# Patient Record
Sex: FEMALE | Race: WHITE | Employment: OTHER | URBAN - METROPOLITAN AREA
[De-identification: names, ages, dates, MRNs, and addresses within clinical notes are randomized per-mention and may not be internally consistent; named-entity substitution may affect disease eponyms.]

---

## 2021-01-20 NOTE — ED INITIAL ASSESSMENT (HPI)
Patient here with report of laceration to right wrist from a kitchen aid mixer bowl. Bleeding stopped.

## 2021-01-21 NOTE — ED PROVIDER NOTES
Patient Seen in: [facility] Emergency Department      History   Patient presents with:  Laceration/Abrasion    Stated Complaint: laceration to wrist, not intentional    HPI/Subjective:   HPI    29-year-old female here with laceration to right wrist. Pulmonary:      Effort: Pulmonary effort is normal.   Musculoskeletal:         General: Signs of injury present. No tenderness. Comments: Laceration to right ventral wrist lateral aspect, 3 cm in length, neurovascular intact.    Skin:     General: Sk wound edges. No sign of vascular, tendon/nerve injury. The wound was approximated with 5 interrupted sutures of 4-0 ethilon, simple closure. Good approximation. The patient tolerated procedure well without complication.            MDM      ASSESSMENT & P

## 2022-11-28 ENCOUNTER — HOSPITAL ENCOUNTER (OUTPATIENT)
Age: 32
Discharge: HOME | End: 2022-11-28
Payer: COMMERCIAL

## 2022-11-28 ENCOUNTER — LAB ENCOUNTER (OUTPATIENT)
Dept: LAB | Age: 32
End: 2022-11-28
Payer: COMMERCIAL

## 2022-11-28 VITALS
HEART RATE: 94 BPM | SYSTOLIC BLOOD PRESSURE: 123 MMHG | OXYGEN SATURATION: 98 % | BODY MASS INDEX: 31.89 KG/M2 | TEMPERATURE: 98 F | HEIGHT: 63 IN | RESPIRATION RATE: 18 BRPM | DIASTOLIC BLOOD PRESSURE: 78 MMHG | WEIGHT: 180 LBS

## 2022-11-28 DIAGNOSIS — Z13.29 THYROID DISORDER SCREENING: ICD-10-CM

## 2022-11-28 DIAGNOSIS — Z13.1 ENCOUNTER FOR SCREENING EXAMINATION FOR IMPAIRED GLUCOSE REGULATION AND DIABETES MELLITUS: ICD-10-CM

## 2022-11-28 DIAGNOSIS — Z13.220 LIPID SCREENING: ICD-10-CM

## 2022-11-28 DIAGNOSIS — J06.9 VIRAL URI: ICD-10-CM

## 2022-11-28 DIAGNOSIS — R68.89 FLU-LIKE SYMPTOMS: Primary | ICD-10-CM

## 2022-11-28 DIAGNOSIS — Z13.0 SCREENING FOR DEFICIENCY ANEMIA: ICD-10-CM

## 2022-11-28 LAB
ALBUMIN SERPL-MCNC: 3.6 G/DL (ref 3.4–5)
ALBUMIN/GLOB SERPL: 1 {RATIO} (ref 1–2)
ALP LIVER SERPL-CCNC: 75 U/L
ALT SERPL-CCNC: 34 U/L
ANION GAP SERPL CALC-SCNC: 5 MMOL/L (ref 0–18)
AST SERPL-CCNC: 15 U/L (ref 15–37)
BASOPHILS # BLD AUTO: 0.04 X10(3) UL (ref 0–0.2)
BASOPHILS NFR BLD AUTO: 0.5 %
BILIRUB SERPL-MCNC: 0.5 MG/DL (ref 0.1–2)
BUN BLD-MCNC: 8 MG/DL (ref 7–18)
CALCIUM BLD-MCNC: 9.1 MG/DL (ref 8.5–10.1)
CHLORIDE SERPL-SCNC: 108 MMOL/L (ref 98–112)
CHOLEST SERPL-MCNC: 188 MG/DL (ref ?–200)
CO2 SERPL-SCNC: 26 MMOL/L (ref 21–32)
CREAT BLD-MCNC: 0.69 MG/DL
EOSINOPHIL # BLD AUTO: 0.07 X10(3) UL (ref 0–0.7)
EOSINOPHIL NFR BLD AUTO: 0.8 %
ERYTHROCYTE [DISTWIDTH] IN BLOOD BY AUTOMATED COUNT: 12.6 %
FASTING PATIENT LIPID ANSWER: YES
FASTING STATUS PATIENT QL REPORTED: YES
GFR SERPLBLD BASED ON 1.73 SQ M-ARVRAT: 120 ML/MIN/1.73M2 (ref 60–?)
GLOBULIN PLAS-MCNC: 3.7 G/DL (ref 2.8–4.4)
GLUCOSE BLD-MCNC: 95 MG/DL (ref 70–99)
HCT VFR BLD AUTO: 39.3 %
HDLC SERPL-MCNC: 57 MG/DL (ref 40–59)
HGB BLD-MCNC: 12.9 G/DL
IMM GRANULOCYTES # BLD AUTO: 0.02 X10(3) UL (ref 0–1)
IMM GRANULOCYTES NFR BLD: 0.2 %
LDLC SERPL CALC-MCNC: 115 MG/DL (ref ?–100)
LYMPHOCYTES # BLD AUTO: 1.15 X10(3) UL (ref 1–4)
LYMPHOCYTES NFR BLD AUTO: 13.5 %
MCH RBC QN AUTO: 30.5 PG (ref 26–34)
MCHC RBC AUTO-ENTMCNC: 32.8 G/DL (ref 31–37)
MCV RBC AUTO: 92.9 FL
MONOCYTES # BLD AUTO: 0.58 X10(3) UL (ref 0.1–1)
MONOCYTES NFR BLD AUTO: 6.8 %
NEUTROPHILS # BLD AUTO: 6.65 X10 (3) UL (ref 1.5–7.7)
NEUTROPHILS # BLD AUTO: 6.65 X10(3) UL (ref 1.5–7.7)
NEUTROPHILS NFR BLD AUTO: 78.2 %
NONHDLC SERPL-MCNC: 131 MG/DL (ref ?–130)
OSMOLALITY SERPL CALC.SUM OF ELEC: 286 MOSM/KG (ref 275–295)
PLATELET # BLD AUTO: 186 10(3)UL (ref 150–450)
POCT INFLUENZA A: NEGATIVE
POCT INFLUENZA B: NEGATIVE
POTASSIUM SERPL-SCNC: 4.2 MMOL/L (ref 3.5–5.1)
PROT SERPL-MCNC: 7.3 G/DL (ref 6.4–8.2)
RBC # BLD AUTO: 4.23 X10(6)UL
S PYO AG THROAT QL: NEGATIVE
SARS-COV-2 RNA RESP QL NAA+PROBE: NOT DETECTED
SODIUM SERPL-SCNC: 139 MMOL/L (ref 136–145)
TRIGL SERPL-MCNC: 88 MG/DL (ref 30–149)
TSI SER-ACNC: 0.66 MIU/ML (ref 0.36–3.74)
VLDLC SERPL CALC-MCNC: 15 MG/DL (ref 0–30)
WBC # BLD AUTO: 8.5 X10(3) UL (ref 4–11)

## 2022-11-28 PROCEDURE — 85025 COMPLETE CBC W/AUTO DIFF WBC: CPT

## 2022-11-28 PROCEDURE — 99203 OFFICE O/P NEW LOW 30 MIN: CPT | Performed by: NURSE PRACTITIONER

## 2022-11-28 PROCEDURE — 36415 COLL VENOUS BLD VENIPUNCTURE: CPT

## 2022-11-28 PROCEDURE — U0002 COVID-19 LAB TEST NON-CDC: HCPCS | Performed by: NURSE PRACTITIONER

## 2022-11-28 PROCEDURE — 80061 LIPID PANEL: CPT

## 2022-11-28 PROCEDURE — 80053 COMPREHEN METABOLIC PANEL: CPT

## 2022-11-28 PROCEDURE — 82306 VITAMIN D 25 HYDROXY: CPT

## 2022-11-28 PROCEDURE — 84443 ASSAY THYROID STIM HORMONE: CPT

## 2022-11-28 PROCEDURE — 87880 STREP A ASSAY W/OPTIC: CPT | Performed by: NURSE PRACTITIONER

## 2022-11-28 PROCEDURE — 87502 INFLUENZA DNA AMP PROBE: CPT | Performed by: NURSE PRACTITIONER

## 2022-12-14 ENCOUNTER — HOSPITAL ENCOUNTER (OUTPATIENT)
Age: 32
Discharge: HOME | End: 2022-12-14
Payer: COMMERCIAL

## 2022-12-14 VITALS
DIASTOLIC BLOOD PRESSURE: 69 MMHG | HEART RATE: 70 BPM | SYSTOLIC BLOOD PRESSURE: 110 MMHG | TEMPERATURE: 98 F | RESPIRATION RATE: 16 BRPM

## 2022-12-14 DIAGNOSIS — B96.89 ACUTE BACTERIAL PHARYNGITIS: Primary | ICD-10-CM

## 2022-12-14 DIAGNOSIS — J02.8 ACUTE BACTERIAL PHARYNGITIS: Primary | ICD-10-CM

## 2022-12-14 LAB — S PYO AG THROAT QL: NEGATIVE

## 2022-12-14 PROCEDURE — 99213 OFFICE O/P EST LOW 20 MIN: CPT | Performed by: NURSE PRACTITIONER

## 2022-12-14 PROCEDURE — 87880 STREP A ASSAY W/OPTIC: CPT | Performed by: NURSE PRACTITIONER

## 2022-12-14 RX ORDER — PREDNISONE 20 MG/1
40 TABLET ORAL DAILY
Qty: 10 TABLET | Refills: 0 | Status: SHIPPED | OUTPATIENT
Start: 2022-12-14 | End: 2022-12-19

## 2022-12-14 RX ORDER — AMOXICILLIN AND CLAVULANATE POTASSIUM 875; 125 MG/1; MG/1
1 TABLET, FILM COATED ORAL 2 TIMES DAILY
Qty: 14 TABLET | Refills: 0 | Status: SHIPPED | OUTPATIENT
Start: 2022-12-14 | End: 2022-12-21

## 2022-12-14 NOTE — DISCHARGE INSTRUCTIONS
Rest and drink plenty of fluids. Take Tylenol and/or ibuprofen as needed for pain or fever. Start the antibiotics and make sure to finish the entire prescription. Start the prednisone as prescribed, and make sure to take this with food. Do not share glasses, cups, or utensils. Make sure to change your toothbrush after 48 hours of antibiotic use. Follow up with your PCP in 1 week. Thank you for choosing [facility] for your care.

## 2022-12-14 NOTE — ED INITIAL ASSESSMENT (HPI)
Pt was seen here on 11/28 for cough and sore throat. Strep, covid, and flu were all negative. Pt returns today with laryngitis x 10 days. Intermittent sore throat continues.  Denies fever, aches/chills

## 2023-04-12 PROCEDURE — 87591 N.GONORRHOEAE DNA AMP PROB: CPT | Performed by: OBSTETRICS & GYNECOLOGY

## 2023-04-12 PROCEDURE — 87491 CHLMYD TRACH DNA AMP PROBE: CPT | Performed by: OBSTETRICS & GYNECOLOGY

## 2023-04-12 PROCEDURE — 87086 URINE CULTURE/COLONY COUNT: CPT | Performed by: OBSTETRICS & GYNECOLOGY

## 2023-04-25 ENCOUNTER — LAB ENCOUNTER (OUTPATIENT)
Dept: LAB | Age: 33
End: 2023-04-25
Attending: OBSTETRICS & GYNECOLOGY
Payer: COMMERCIAL

## 2023-04-25 DIAGNOSIS — Z34.81 SUPERVISION OF NORMAL INTRAUTERINE PREGNANCY IN MULTIGRAVIDA, FIRST TRIMESTER: ICD-10-CM

## 2023-04-25 LAB
ANTIBODY SCREEN: NEGATIVE
BASOPHILS # BLD AUTO: 0.04 X10(3) UL (ref 0–0.2)
BASOPHILS NFR BLD AUTO: 0.6 %
EOSINOPHIL # BLD AUTO: 0.12 X10(3) UL (ref 0–0.7)
EOSINOPHIL NFR BLD AUTO: 1.7 %
ERYTHROCYTE [DISTWIDTH] IN BLOOD BY AUTOMATED COUNT: 12.2 %
HBV SURFACE AG SER-ACNC: <0.1 [IU]/L
HBV SURFACE AG SERPL QL IA: NONREACTIVE
HCT VFR BLD AUTO: 37.9 %
HCV AB SERPL QL IA: NONREACTIVE
HGB BLD-MCNC: 12.9 G/DL
IMM GRANULOCYTES # BLD AUTO: 0.02 X10(3) UL (ref 0–1)
IMM GRANULOCYTES NFR BLD: 0.3 %
LYMPHOCYTES # BLD AUTO: 1.87 X10(3) UL (ref 1–4)
LYMPHOCYTES NFR BLD AUTO: 27 %
MCH RBC QN AUTO: 30.1 PG (ref 26–34)
MCHC RBC AUTO-ENTMCNC: 34 G/DL (ref 31–37)
MCV RBC AUTO: 88.6 FL
MONOCYTES # BLD AUTO: 0.35 X10(3) UL (ref 0.1–1)
MONOCYTES NFR BLD AUTO: 5.1 %
NEUTROPHILS # BLD AUTO: 4.53 X10 (3) UL (ref 1.5–7.7)
NEUTROPHILS # BLD AUTO: 4.53 X10(3) UL (ref 1.5–7.7)
NEUTROPHILS NFR BLD AUTO: 65.3 %
PLATELET # BLD AUTO: 213 10(3)UL (ref 150–450)
RBC # BLD AUTO: 4.28 X10(6)UL
RH BLOOD TYPE: POSITIVE
RUBV IGG SER QL: POSITIVE
RUBV IGG SER-ACNC: 94.3 IU/ML (ref 10–?)
T PALLIDUM AB SER QL IA: NONREACTIVE
WBC # BLD AUTO: 6.9 X10(3) UL (ref 4–11)

## 2023-04-25 PROCEDURE — 86762 RUBELLA ANTIBODY: CPT

## 2023-04-25 PROCEDURE — 86850 RBC ANTIBODY SCREEN: CPT

## 2023-04-25 PROCEDURE — 86900 BLOOD TYPING SEROLOGIC ABO: CPT

## 2023-04-25 PROCEDURE — 85025 COMPLETE CBC W/AUTO DIFF WBC: CPT

## 2023-04-25 PROCEDURE — 36415 COLL VENOUS BLD VENIPUNCTURE: CPT

## 2023-04-25 PROCEDURE — 87340 HEPATITIS B SURFACE AG IA: CPT

## 2023-04-25 PROCEDURE — 87389 HIV-1 AG W/HIV-1&-2 AB AG IA: CPT

## 2023-04-25 PROCEDURE — 86901 BLOOD TYPING SEROLOGIC RH(D): CPT

## 2023-04-25 PROCEDURE — 86803 HEPATITIS C AB TEST: CPT

## 2023-04-25 PROCEDURE — 86780 TREPONEMA PALLIDUM: CPT

## 2023-07-27 ENCOUNTER — LAB ENCOUNTER (OUTPATIENT)
Dept: LAB | Age: 33
End: 2023-07-27
Attending: OBSTETRICS & GYNECOLOGY
Payer: COMMERCIAL

## 2023-07-27 DIAGNOSIS — Z3A.18 18 WEEKS GESTATION OF PREGNANCY: ICD-10-CM

## 2023-07-27 PROCEDURE — 36415 COLL VENOUS BLD VENIPUNCTURE: CPT

## 2023-07-27 PROCEDURE — 82105 ALPHA-FETOPROTEIN SERUM: CPT

## 2023-08-01 LAB
AFP MOM: 1.72
AFP VALUE: 146.7 NG/ML
GA ON COLL DATE: 23.9 WEEKS
INSULIN DEP AFP: NO
MAT AGE AT EDD: 31.6 YR
MULTIPLE GEST AFP: NO
OSBR RISK 1 IN AFP: 1551
WEIGHT AFP: 183 LBS

## 2023-08-02 ENCOUNTER — LAB ENCOUNTER (OUTPATIENT)
Dept: LAB | Age: 33
End: 2023-08-02
Attending: OBSTETRICS & GYNECOLOGY
Payer: COMMERCIAL

## 2023-08-02 DIAGNOSIS — Z34.82 ENCOUNTER FOR SUPERVISION OF OTHER NORMAL PREGNANCY IN SECOND TRIMESTER: ICD-10-CM

## 2023-08-02 LAB
BASOPHILS # BLD AUTO: 0.04 X10(3) UL (ref 0–0.2)
BASOPHILS NFR BLD AUTO: 0.4 %
EOSINOPHIL # BLD AUTO: 0.12 X10(3) UL (ref 0–0.7)
EOSINOPHIL NFR BLD AUTO: 1.2 %
ERYTHROCYTE [DISTWIDTH] IN BLOOD BY AUTOMATED COUNT: 13.2 %
GLUCOSE 1H P GLC SERPL-MCNC: 139 MG/DL
HCT VFR BLD AUTO: 36.4 %
HGB BLD-MCNC: 11.7 G/DL
IMM GRANULOCYTES # BLD AUTO: 0.06 X10(3) UL (ref 0–1)
IMM GRANULOCYTES NFR BLD: 0.6 %
LYMPHOCYTES # BLD AUTO: 1.47 X10(3) UL (ref 1–4)
LYMPHOCYTES NFR BLD AUTO: 15.1 %
MCH RBC QN AUTO: 31.3 PG (ref 26–34)
MCHC RBC AUTO-ENTMCNC: 32.1 G/DL (ref 31–37)
MCV RBC AUTO: 97.3 FL
MONOCYTES # BLD AUTO: 0.53 X10(3) UL (ref 0.1–1)
MONOCYTES NFR BLD AUTO: 5.4 %
NEUTROPHILS # BLD AUTO: 7.54 X10 (3) UL (ref 1.5–7.7)
NEUTROPHILS # BLD AUTO: 7.54 X10(3) UL (ref 1.5–7.7)
NEUTROPHILS NFR BLD AUTO: 77.3 %
PLATELET # BLD AUTO: 194 10(3)UL (ref 150–450)
RBC # BLD AUTO: 3.74 X10(6)UL
WBC # BLD AUTO: 9.8 X10(3) UL (ref 4–11)

## 2023-08-02 PROCEDURE — 36415 COLL VENOUS BLD VENIPUNCTURE: CPT

## 2023-08-02 PROCEDURE — 82950 GLUCOSE TEST: CPT

## 2023-08-02 PROCEDURE — 85025 COMPLETE CBC W/AUTO DIFF WBC: CPT

## 2023-08-10 ENCOUNTER — LAB ENCOUNTER (OUTPATIENT)
Dept: LAB | Age: 33
End: 2023-08-10
Attending: OBSTETRICS & GYNECOLOGY
Payer: COMMERCIAL

## 2023-08-10 DIAGNOSIS — O99.810 ABNORMAL MATERNAL GLUCOSE TOLERANCE, ANTEPARTUM: ICD-10-CM

## 2023-08-10 LAB
GLUCOSE 1H P GLC SERPL-MCNC: 173 MG/DL
GLUCOSE 2H P GLC SERPL-MCNC: 158 MG/DL
GLUCOSE 3H P GLC SERPL-MCNC: 132 MG/DL (ref 70–140)
GLUCOSE P FAST SERPL-MCNC: 88 MG/DL

## 2023-08-10 PROCEDURE — 36415 COLL VENOUS BLD VENIPUNCTURE: CPT

## 2023-08-10 PROCEDURE — 82952 GTT-ADDED SAMPLES: CPT

## 2023-08-10 PROCEDURE — 82951 GLUCOSE TOLERANCE TEST (GTT): CPT

## 2023-09-21 ENCOUNTER — LAB ENCOUNTER (OUTPATIENT)
Dept: LAB | Age: 33
End: 2023-09-21
Attending: OBSTETRICS & GYNECOLOGY
Payer: COMMERCIAL

## 2023-09-21 DIAGNOSIS — Z34.83 SUPERVISION OF NORMAL INTRAUTERINE PREGNANCY IN MULTIGRAVIDA, THIRD TRIMESTER: ICD-10-CM

## 2023-09-21 PROCEDURE — 36415 COLL VENOUS BLD VENIPUNCTURE: CPT

## 2023-09-21 PROCEDURE — 87389 HIV-1 AG W/HIV-1&-2 AB AG IA: CPT

## 2023-10-11 PROCEDURE — 87653 STREP B DNA AMP PROBE: CPT | Performed by: OBSTETRICS & GYNECOLOGY

## 2023-10-11 PROCEDURE — 87081 CULTURE SCREEN ONLY: CPT | Performed by: OBSTETRICS & GYNECOLOGY

## 2023-11-05 ENCOUNTER — HOSPITAL ENCOUNTER (OUTPATIENT)
Facility: HOSPITAL | Age: 33
Discharge: HOME | End: 2023-11-05
Attending: OBSTETRICS & GYNECOLOGY | Admitting: OBSTETRICS & GYNECOLOGY
Payer: COMMERCIAL

## 2023-11-05 VITALS
WEIGHT: 211 LBS | DIASTOLIC BLOOD PRESSURE: 83 MMHG | BODY MASS INDEX: 37 KG/M2 | SYSTOLIC BLOOD PRESSURE: 133 MMHG | HEART RATE: 93 BPM

## 2023-11-05 PROCEDURE — 99214 OFFICE O/P EST MOD 30 MIN: CPT

## 2023-11-05 PROCEDURE — 59025 FETAL NON-STRESS TEST: CPT

## 2023-11-05 NOTE — DISCHARGE INSTRUCTIONS
Discharge Instructions    Diet: Regular  Activity: Normal activity         General Instructions    Call your [facility] doctor if: Fluid leaking from your vagina; Decrease in fetal movement;Vaginal or rectal pressure;Uterine contractions 10 minutes or closer for 1 to 2 hours;Vaginal bleeding;Uterine contractions increasing in intensity and frequency; Temperature greater than 100F  Early labor comfort measures: Relax, sleep, take a warm bath or shower for 30 minutes or less; Take a walk;Drink fluids and eat small light meals

## 2023-11-05 NOTE — NST
Nonstress Test   Patient: Daysi Boykin    Gestation: 39w5d    NST:       Variability: Moderate           Accelerations: Yes           Decelerations: None                        Uterine Irritability: Yes           Contractions: Irregular                      Baseline = 130                  Acoustic Stimulator: No           Nonstress Test Interpretation: Reactive           Nonstress Test Second Interpretation: Reactive                     Additional Comments  Agree with above    Hair Recinos  11/6/23

## 2023-11-05 NOTE — PROGRESS NOTES
Pt is a 34year old female admitted to [facility]. Patient presents with:  R/o Rom     Pt is  39w5d intra-uterine pregnancy. History obtained. Oriented to room, staff, and plan of care.

## 2023-11-11 ENCOUNTER — HOSPITAL ENCOUNTER (INPATIENT)
Facility: HOSPITAL | Age: 33
LOS: 2 days | Discharge: HOME | End: 2023-11-13
Attending: OBSTETRICS & GYNECOLOGY | Admitting: OBSTETRICS & GYNECOLOGY
Payer: COMMERCIAL

## 2023-11-11 PROCEDURE — 99214 OFFICE O/P EST MOD 30 MIN: CPT

## 2023-11-11 RX ORDER — SODIUM CHLORIDE, SODIUM LACTATE, POTASSIUM CHLORIDE, CALCIUM CHLORIDE 600; 310; 30; 20 MG/100ML; MG/100ML; MG/100ML; MG/100ML
INJECTION, SOLUTION INTRAVENOUS CONTINUOUS
Status: DISCONTINUED | OUTPATIENT
Start: 2023-11-11 | End: 2023-11-12

## 2023-11-11 RX ORDER — HYDROMORPHONE HYDROCHLORIDE 1 MG/ML
1 INJECTION, SOLUTION INTRAMUSCULAR; INTRAVENOUS; SUBCUTANEOUS EVERY 2 HOUR PRN
Status: DISCONTINUED | OUTPATIENT
Start: 2023-11-11 | End: 2023-11-12

## 2023-11-12 ENCOUNTER — ANESTHESIA EVENT (OUTPATIENT)
Dept: OBGYN UNIT | Facility: HOSPITAL | Age: 33
End: 2023-11-12
Payer: COMMERCIAL

## 2023-11-12 ENCOUNTER — ANESTHESIA (OUTPATIENT)
Dept: OBGYN UNIT | Facility: HOSPITAL | Age: 33
End: 2023-11-12
Payer: COMMERCIAL

## 2023-11-12 PROBLEM — Z34.90 PREGNANCY (HCC): Status: ACTIVE | Noted: 2023-11-12

## 2023-11-12 PROBLEM — Z34.90 PREGNANCY: Status: ACTIVE | Noted: 2023-11-12

## 2023-11-12 LAB
ANTIBODY SCREEN: NEGATIVE
BASOPHILS # BLD AUTO: 0.05 X10(3) UL (ref 0–0.2)
BASOPHILS NFR BLD AUTO: 0.4 %
EOSINOPHIL # BLD AUTO: 0.08 X10(3) UL (ref 0–0.7)
EOSINOPHIL NFR BLD AUTO: 0.6 %
ERYTHROCYTE [DISTWIDTH] IN BLOOD BY AUTOMATED COUNT: 13.2 %
HCT VFR BLD AUTO: 36.3 %
HGB BLD-MCNC: 12.2 G/DL
IMM GRANULOCYTES # BLD AUTO: 0.11 X10(3) UL (ref 0–1)
IMM GRANULOCYTES NFR BLD: 0.9 %
LYMPHOCYTES # BLD AUTO: 1.93 X10(3) UL (ref 1–4)
LYMPHOCYTES NFR BLD AUTO: 15.4 %
MCH RBC QN AUTO: 31 PG (ref 26–34)
MCHC RBC AUTO-ENTMCNC: 33.6 G/DL (ref 31–37)
MCV RBC AUTO: 92.4 FL
MONOCYTES # BLD AUTO: 0.75 X10(3) UL (ref 0.1–1)
MONOCYTES NFR BLD AUTO: 6 %
NEUTROPHILS # BLD AUTO: 9.62 X10 (3) UL (ref 1.5–7.7)
NEUTROPHILS # BLD AUTO: 9.62 X10(3) UL (ref 1.5–7.7)
NEUTROPHILS NFR BLD AUTO: 76.7 %
PLATELET # BLD AUTO: 174 10(3)UL (ref 150–450)
RBC # BLD AUTO: 3.93 X10(6)UL
RH BLOOD TYPE: POSITIVE
T PALLIDUM AB SER QL IA: NONREACTIVE
WBC # BLD AUTO: 12.5 X10(3) UL (ref 4–11)

## 2023-11-12 PROCEDURE — 85025 COMPLETE CBC W/AUTO DIFF WBC: CPT | Performed by: OBSTETRICS & GYNECOLOGY

## 2023-11-12 PROCEDURE — 10H073Z INSERTION OF MONITORING ELECTRODE INTO PRODUCTS OF CONCEPTION, VIA NATURAL OR ARTIFICIAL OPENING: ICD-10-PCS | Performed by: OBSTETRICS & GYNECOLOGY

## 2023-11-12 PROCEDURE — 10H07YZ INSERTION OF OTHER DEVICE INTO PRODUCTS OF CONCEPTION, VIA NATURAL OR ARTIFICIAL OPENING: ICD-10-PCS | Performed by: OBSTETRICS & GYNECOLOGY

## 2023-11-12 PROCEDURE — 4A1H74Z MONITORING OF PRODUCTS OF CONCEPTION, CARDIAC ELECTRICAL ACTIVITY, VIA NATURAL OR ARTIFICIAL OPENING: ICD-10-PCS | Performed by: OBSTETRICS & GYNECOLOGY

## 2023-11-12 PROCEDURE — 86900 BLOOD TYPING SEROLOGIC ABO: CPT | Performed by: OBSTETRICS & GYNECOLOGY

## 2023-11-12 PROCEDURE — 86901 BLOOD TYPING SEROLOGIC RH(D): CPT | Performed by: OBSTETRICS & GYNECOLOGY

## 2023-11-12 PROCEDURE — 0KQM0ZZ REPAIR PERINEUM MUSCLE, OPEN APPROACH: ICD-10-PCS | Performed by: OBSTETRICS & GYNECOLOGY

## 2023-11-12 PROCEDURE — 86780 TREPONEMA PALLIDUM: CPT | Performed by: OBSTETRICS & GYNECOLOGY

## 2023-11-12 PROCEDURE — 86850 RBC ANTIBODY SCREEN: CPT | Performed by: OBSTETRICS & GYNECOLOGY

## 2023-11-12 RX ORDER — IBUPROFEN 600 MG/1
600 TABLET ORAL EVERY 6 HOURS
Status: DISCONTINUED | OUTPATIENT
Start: 2023-11-12 | End: 2023-11-14

## 2023-11-12 RX ORDER — NALBUPHINE HYDROCHLORIDE 10 MG/ML
2.5 INJECTION, SOLUTION INTRAMUSCULAR; INTRAVENOUS; SUBCUTANEOUS
Status: DISCONTINUED | OUTPATIENT
Start: 2023-11-12 | End: 2023-11-12

## 2023-11-12 RX ORDER — DOCUSATE SODIUM 100 MG/1
100 CAPSULE, LIQUID FILLED ORAL 2 TIMES DAILY
Status: DISCONTINUED | OUTPATIENT
Start: 2023-11-12 | End: 2023-11-14

## 2023-11-12 RX ORDER — LIDOCAINE HYDROCHLORIDE 10 MG/ML
INJECTION, SOLUTION EPIDURAL; INFILTRATION; INTRACAUDAL; PERINEURAL AS NEEDED
Status: DISCONTINUED | OUTPATIENT
Start: 2023-11-12 | End: 2023-11-12 | Stop reason: SURG

## 2023-11-12 RX ORDER — BUPIVACAINE HCL/0.9 % NACL/PF 0.25 %
5 PLASTIC BAG, INJECTION (ML) EPIDURAL AS NEEDED
Status: DISCONTINUED | OUTPATIENT
Start: 2023-11-12 | End: 2023-11-12

## 2023-11-12 RX ORDER — BISACODYL 10 MG
10 SUPPOSITORY, RECTAL RECTAL ONCE AS NEEDED
Status: DISCONTINUED | OUTPATIENT
Start: 2023-11-12 | End: 2023-11-14

## 2023-11-12 RX ORDER — ONDANSETRON 2 MG/ML
4 INJECTION INTRAMUSCULAR; INTRAVENOUS EVERY 6 HOURS PRN
Status: DISCONTINUED | OUTPATIENT
Start: 2023-11-12 | End: 2023-11-12

## 2023-11-12 RX ORDER — ACETAMINOPHEN 500 MG
1000 TABLET ORAL EVERY 6 HOURS PRN
Status: DISCONTINUED | OUTPATIENT
Start: 2023-11-12 | End: 2023-11-14

## 2023-11-12 RX ORDER — IBUPROFEN 600 MG/1
600 TABLET ORAL ONCE AS NEEDED
Status: DISCONTINUED | OUTPATIENT
Start: 2023-11-12 | End: 2023-11-12

## 2023-11-12 RX ORDER — MISOPROSTOL 200 UG/1
1000 TABLET ORAL ONCE AS NEEDED
Status: ACTIVE | OUTPATIENT
Start: 2023-11-12 | End: 2023-11-12

## 2023-11-12 RX ORDER — ACETAMINOPHEN 500 MG
500 TABLET ORAL EVERY 6 HOURS PRN
Status: DISCONTINUED | OUTPATIENT
Start: 2023-11-12 | End: 2023-11-14

## 2023-11-12 RX ORDER — SIMETHICONE 80 MG
80 TABLET,CHEWABLE ORAL 3 TIMES DAILY PRN
Status: DISCONTINUED | OUTPATIENT
Start: 2023-11-12 | End: 2023-11-14

## 2023-11-12 RX ORDER — ACETAMINOPHEN 500 MG
500 TABLET ORAL EVERY 6 HOURS PRN
Status: DISCONTINUED | OUTPATIENT
Start: 2023-11-12 | End: 2023-11-12

## 2023-11-12 RX ORDER — LIDOCAINE HYDROCHLORIDE AND EPINEPHRINE 15; 5 MG/ML; UG/ML
INJECTION, SOLUTION EPIDURAL AS NEEDED
Status: DISCONTINUED | OUTPATIENT
Start: 2023-11-12 | End: 2023-11-12 | Stop reason: SURG

## 2023-11-12 RX ORDER — ACETAMINOPHEN 500 MG
1000 TABLET ORAL EVERY 6 HOURS PRN
Status: DISCONTINUED | OUTPATIENT
Start: 2023-11-12 | End: 2023-11-12

## 2023-11-12 RX ORDER — SODIUM CHLORIDE, SODIUM LACTATE, POTASSIUM CHLORIDE, CALCIUM CHLORIDE 600; 310; 30; 20 MG/100ML; MG/100ML; MG/100ML; MG/100ML
INJECTION, SOLUTION INTRAVENOUS CONTINUOUS
Status: DISCONTINUED | OUTPATIENT
Start: 2023-11-12 | End: 2023-11-12

## 2023-11-12 RX ORDER — TERBUTALINE SULFATE 1 MG/ML
0.25 INJECTION, SOLUTION SUBCUTANEOUS AS NEEDED
Status: DISCONTINUED | OUTPATIENT
Start: 2023-11-12 | End: 2023-11-12

## 2023-11-12 RX ORDER — DEXTROSE, SODIUM CHLORIDE, SODIUM LACTATE, POTASSIUM CHLORIDE, AND CALCIUM CHLORIDE 5; .6; .31; .03; .02 G/100ML; G/100ML; G/100ML; G/100ML; G/100ML
INJECTION, SOLUTION INTRAVENOUS AS NEEDED
Status: DISCONTINUED | OUTPATIENT
Start: 2023-11-12 | End: 2023-11-12

## 2023-11-12 RX ORDER — CITRIC ACID/SODIUM CITRATE 334-500MG
30 SOLUTION, ORAL ORAL AS NEEDED
Status: DISCONTINUED | OUTPATIENT
Start: 2023-11-12 | End: 2023-11-12

## 2023-11-12 RX ADMIN — LIDOCAINE HYDROCHLORIDE AND EPINEPHRINE 2 ML: 15; 5 INJECTION, SOLUTION EPIDURAL at 10:02:00

## 2023-11-12 RX ADMIN — LIDOCAINE HYDROCHLORIDE AND EPINEPHRINE 3 ML: 15; 5 INJECTION, SOLUTION EPIDURAL at 10:01:00

## 2023-11-12 RX ADMIN — LIDOCAINE HYDROCHLORIDE 3 ML: 10 INJECTION, SOLUTION EPIDURAL; INFILTRATION; INTRACAUDAL; PERINEURAL at 09:59:00

## 2023-11-12 NOTE — PROGRESS NOTES
Tracing still excellent with moderate variability.   Good acceleratons and ocassional early and variable decelerations  Cervix now 9 cm' per RN check    Continue to allow a PADMINI Rutledge  11/12/23  15:00

## 2023-11-12 NOTE — PROGRESS NOTES
Admitted with regular contractions late last night for a Trial of Labor in latent phase    Cx - unchanged from office exam: 2+/90/-1    Over the night contractions spaced out with IV analgesics - reactive tracing. Good accelerations no decelerations (negative spontaneous CST)    No cervical change this morning.      Options discussed with RN - she will review with patient  Trying to avoid an IOL in a scared uterus     Saad Shay    11/12/23  07:15

## 2023-11-12 NOTE — PROGRESS NOTES
Epidural placed and is working well  Low dose pitocin started to facilitate the frequency of the contractions    VE: 6/100/0 no caput or molding - ISE and IUPC placed per protocol. Tracing ressuring with rare variables and frequent acceleration. Moderate variability persists.   Contractions now every 3 - 4 minutes lasting 60-70 seconds    Continue to allow a Trial of Labor    Ayala Tatum  11/12/23  12:30

## 2023-11-12 NOTE — ANESTHESIA PROCEDURE NOTES
80 Labor Analgesia    Date/Time: 11/12/2023 9:52 AM    Performed by: Stevenson Syed MD  Authorized by: Stevenson Syed MD      General Information and Staff    Start Time:  11/12/2023 9:52 AM  End Time:  11/12/2023 10:01 AM  Anesthesiologist:  Stevenson Syed MD  Performed by:   Anesthesiologist  Patient Location:  OB  Site Identification: surface landmarks    Reason for Block: labor epidural    Preanesthetic Checklist: patient identified, IV checked, risks and benefits discussed, monitors and equipment checked, pre-op evaluation, timeout performed, IV bolus, anesthesia consent and sterile technique used      Procedure Details    Patient Position:  Sitting  Prep: ChloraPrep    Monitoring:  Heart rate and continuous pulse ox  Approach:  Midline    Epidural Needle    Injection Technique:  ABBI air  Needle Type:  Tuohy  Needle Gauge:  17 G  Needle Length:  3.375 in  Needle Insertion Depth:  6  Location:  L4-5    Spinal Needle      Catheter    Catheter Type:  End hole  Catheter Size:  19 G  Catheter at Skin Depth:  12  Test Dose:  Negative    Assessment      Additional Comments     Test Dose Given at 0952 am  Fentanyl 2 mc/ml + Ropivicaine 0.15% epidural infusion 12cc/hr  Fentanyl 50 mcg/mL 100 mcg

## 2023-11-12 NOTE — PROGRESS NOTES
admitted to triage 3. Ambulated to room with  present, pt to bathroom, gown on, pt to bed. Pt states ctxs have been irregular all day and became more intense and regular around . Pt denies lof or bleeding. Efm and toco applied. Initial assessment done.

## 2023-11-12 NOTE — PROGRESS NOTES
Progressed to Complete nicely    Had a good urge to push - started pushing with good progress over the past hour    Head visible with pushing    Tracing reassuring     CPM    Letha Ruelas  11/12/23  17:05

## 2023-11-13 VITALS
OXYGEN SATURATION: 99 % | RESPIRATION RATE: 16 BRPM | SYSTOLIC BLOOD PRESSURE: 116 MMHG | DIASTOLIC BLOOD PRESSURE: 80 MMHG | TEMPERATURE: 98 F | HEIGHT: 63 IN | BODY MASS INDEX: 37.56 KG/M2 | WEIGHT: 212 LBS | HEART RATE: 73 BPM

## 2023-11-13 PROBLEM — Z34.90 PREGNANCY (HCC): Status: RESOLVED | Noted: 2023-11-12 | Resolved: 2023-11-13

## 2023-11-13 PROBLEM — Z34.90 PREGNANCY: Status: RESOLVED | Noted: 2023-11-12 | Resolved: 2023-11-13

## 2023-11-13 PROBLEM — O34.219 VAGINAL BIRTH AFTER CESAREAN SECTION (HCC): Status: ACTIVE | Noted: 2023-11-13

## 2023-11-13 PROBLEM — O34.219 VAGINAL BIRTH AFTER CESAREAN SECTION: Status: ACTIVE | Noted: 2023-11-13

## 2023-11-13 LAB
BASOPHILS # BLD AUTO: 0.04 X10(3) UL (ref 0–0.2)
BASOPHILS NFR BLD AUTO: 0.3 %
DEPRECATED HBV CORE AB SER IA-ACNC: 31.5 NG/ML
EOSINOPHIL # BLD AUTO: 0.06 X10(3) UL (ref 0–0.7)
EOSINOPHIL NFR BLD AUTO: 0.4 %
ERYTHROCYTE [DISTWIDTH] IN BLOOD BY AUTOMATED COUNT: 13.3 %
HCT VFR BLD AUTO: 30 %
HGB BLD-MCNC: 9.7 G/DL
IMM GRANULOCYTES # BLD AUTO: 0.08 X10(3) UL (ref 0–1)
IMM GRANULOCYTES NFR BLD: 0.6 %
LYMPHOCYTES # BLD AUTO: 2.11 X10(3) UL (ref 1–4)
LYMPHOCYTES NFR BLD AUTO: 15 %
MCH RBC QN AUTO: 30.4 PG (ref 26–34)
MCHC RBC AUTO-ENTMCNC: 32.3 G/DL (ref 31–37)
MCV RBC AUTO: 94 FL
MONOCYTES # BLD AUTO: 1.04 X10(3) UL (ref 0.1–1)
MONOCYTES NFR BLD AUTO: 7.4 %
NEUTROPHILS # BLD AUTO: 10.72 X10 (3) UL (ref 1.5–7.7)
NEUTROPHILS # BLD AUTO: 10.72 X10(3) UL (ref 1.5–7.7)
NEUTROPHILS NFR BLD AUTO: 76.3 %
PLATELET # BLD AUTO: 136 10(3)UL (ref 150–450)
RBC # BLD AUTO: 3.19 X10(6)UL
WBC # BLD AUTO: 14.1 X10(3) UL (ref 4–11)

## 2023-11-13 PROCEDURE — 85025 COMPLETE CBC W/AUTO DIFF WBC: CPT | Performed by: OBSTETRICS & GYNECOLOGY

## 2023-11-13 PROCEDURE — 82728 ASSAY OF FERRITIN: CPT | Performed by: OBSTETRICS & GYNECOLOGY

## 2023-11-13 RX ORDER — IBUPROFEN 600 MG/1
600 TABLET ORAL EVERY 6 HOURS PRN
Qty: 20 TABLET | Refills: 1 | Status: SHIPPED | OUTPATIENT
Start: 2023-11-13

## 2023-11-13 NOTE — PROGRESS NOTES
Patient admitted to room via wheel chair. Oriented to room. Safety precautions initiated. Call light within reach. Teaching and care plan initiated. Patient instructed to call staff for assistance to go to the bathroom. Patient verbalizes understanding. ID bands matched and confirmed.

## 2023-11-13 NOTE — DISCHARGE SUMMARY
[facility]  Discharge Summary    [address] [facility] Patient Status:  Inpatient    1990 MRN [id number]   [facility] 1SW-J Attending Alba Melchor MD   Hosp Day # 2 PCP Lizbet Cain MD     Admit Date:  2023    EDC: Estimated Date of Delivery: 23    Gestational Age: 39w6d    Antepartum complications: See Prenatal Record    Date of Delivery: See Delivery Summary    Delivered By:  See Delivery Summary    Delivery Type: vaginal birth after  ()       Intrapartum Complications: See Delivery Summary    Episiotomy / lacerations: See Delivery Summary      Rh Immune Globulin Given:  See Prenatal Record and nursing notes    Rubella Vaccine Given: See Prenatal Record and nursing notes    Activity:  Routine post partum and post operative instructions if  section    Medications:  Motrin alternating with Tylenol,     Diet:  General    Discharge Date: 2023          Plan:       Follow-up appointment in 6  weeks  Routine post partum instructions    Elizabeth Tilley MD  2023  2:58 PM

## 2023-11-13 NOTE — L&D DELIVERY NOTE
Vaginal Delivery Note          Kriss Madden [facility] Patient Status:  Inpatient    1990 MRN [id number]   Location [address] [facility] Attending Patricia Hodges MD   Hosp Day # 1 PCP Usman Packer MD       Pre Op Dx:  IUP at 40 weeks in labor         Previous  section    Post Op Dx: Same - delivered    Op: Normal Spontaneous Vaginal Delivery After  Section -     Surgeon:  Shannen Fernández       Anesthesia: Epidural    QBL:  152 cc's    Indications:  See Delivery Summary    Findings:    Head: OA, Sex: Female    Weight:   9# 2 oz   Apgars:  8/9    Shoulders:  normal   Nuchal: snug X 1,  Placenta: Intact with 3 vessels  Unique findings: none       Procedure: The patients was placed in the dorsolithotomy position and prepped. She was encouraged to push. As the head was delivered the legs were lowered and the perineum was supported to decrease the risk of tearing. The infants nose and mouth were bulb suctioned and the nuchal cord was reduced. The shoulders rotated easily. The infant was dried and suctioned. The baby was placed on the mothers abdomen at her request.  The cord was doubly clamped and cut after 30 seconds. The cord blood was sampled. IV pitocin and gentle uterine massage helped delivery of the placenta intact with 3 vessels. DIRECT PALPATION OF THE LOWER UTERINE SEGMENT WAS INTACT   Umbilical cord gases were not sent. The second degree perineal laceration repaired in layers. The vaginal portion was approximated with a running locking  2.0 rapide vicryl. A crown stitch was placed and tied. The perineum was approximated with one 2.0 rapide vicryl suture. The skin was approximated with 3.0 rapide vicryl in a interrupted subcuticular fashion. Rectal-vaginal exam was normal.     Bleeding was minimal.  The patient was then moved to the supine position in stable condition.   Counts were correct. Complications:  None    Mother and infant in good condition.     Abby Aleman MD  11/12/2023  6:42 PM

## 2023-11-13 NOTE — PLAN OF CARE
Problem: BIRTH - VAGINAL/ SECTION  Goal: Fetal and maternal status remain reassuring during the birth process  Description: INTERVENTIONS:  - Monitor vital signs  - Monitor fetal heart rate  - Monitor uterine activity  - Monitor labor progression (vaginal delivery)  - DVT prophylaxis (C/S delivery)  - Surgical antibiotic prophylaxis (C/S delivery)  Outcome: Completed     Problem: PAIN - ADULT  Goal: Verbalizes/displays adequate comfort level or patient's stated pain goal  Description: INTERVENTIONS:  - Encourage pt to monitor pain and request assistance  - Assess pain using appropriate pain scale  - Administer analgesics based on type and severity of pain and evaluate response  - Implement non-pharmacological measures as appropriate and evaluate response  - Consider cultural and social influences on pain and pain management  - Manage/alleviate anxiety  - Utilize distraction and/or relaxation techniques  - Monitor for opioid side effects  - Notify MD/LIP if interventions unsuccessful or patient reports new pain  - Anticipate increased pain with activity and pre-medicate as appropriate  Outcome: Completed     Problem: ANXIETY  Goal: Will report anxiety at manageable levels  Description: INTERVENTIONS:  - Administer medication as ordered  - Teach and rehearse alternative coping skills  - Provide emotional support with 1:1 interaction with staff  Outcome: Completed     Problem: Patient/Family Goals  Goal: Patient/Family Long Term Goal  Description: Patient's Long Term Goal: to have an uncomplicated delivery    Interventions:  -   - See additional Care Plan goals for specific interventions  Outcome: Completed  Goal: Patient/Family Short Term Goal  Description: Patient's Short Term Goal: adequate pain management    Interventions:   -   - See additional Care Plan goals for specific interventions  Outcome: Completed

## 2023-11-13 NOTE — PROGRESS NOTES
Patient up to bathroom with assist x 1. Unable to void at this time. Patient transferred to mother/baby room 1112 per wheelchair in stable condition with baby and personal belongings. Accompanied by significant other and staff. Report given to mother/baby RN Omar Deleon.

## 2023-11-14 NOTE — PROGRESS NOTES
Pt discharged home in stable condition in wheelchair. accompanied by baby in car seat and belongings with

## 2023-11-15 ENCOUNTER — TELEPHONE (OUTPATIENT)
Dept: OBGYN UNIT | Facility: HOSPITAL | Age: 33
End: 2023-11-15

## 2023-11-15 NOTE — PROGRESS NOTES
Cradle call completed. Mom and baby care reviewed. All questions answered. Cradle call letters sent via [address] Baby goes to pediatrician tonight.

## 2023-11-21 ENCOUNTER — NURSE ONLY (OUTPATIENT)
Dept: LACTATION | Facility: HOSPITAL | Age: 33
End: 2023-11-21
Attending: OBSTETRICS & GYNECOLOGY
Payer: COMMERCIAL

## 2023-11-21 PROCEDURE — 99212 OFFICE O/P EST SF 10 MIN: CPT

## 2024-05-18 NOTE — LACTATION NOTE
"Patient Care Team:  Arias Lehman APRN as PCP - General (Family Medicine)  Bear Rojas PA as Referring Physician (Emergency Medicine)  Arthur Salazar MD as Consulting Physician (Hematology and Oncology)    Chief Complaint:abnormal stress, history of CAD    Interval History:   No chest pain currently.  Stress test images reviewed and I do not see significant reversibility.  He has evidence of an inferior infarct and a moderately decreased ejection fraction.    Objective   Vital Signs  Temp:  [98.1 °F (36.7 °C)-98.6 °F (37 °C)] 98.4 °F (36.9 °C)  Heart Rate:  [71-83] 71  Resp:  [18] 18  BP: (132-179)/(75-86) 132/78  No intake or output data in the 24 hours ending 05/18/24 1352  Flowsheet Rows      Flowsheet Row First Filed Value   Admission Height 175.3 cm (69\") Documented at 05/14/2024 2100   Admission Weight 95.7 kg (210 lb 14.4 oz) Documented at 05/14/2024 2100            Temp:  [98.1 °F (36.7 °C)-98.6 °F (37 °C)] 98.4 °F (36.9 °C)  Heart Rate:  [71-83] 71  Resp:  [18] 18  BP: (132-179)/(75-86) 132/78  No intake or output data in the 24 hours ending 05/18/24 1352  Flowsheet Rows      Flowsheet Row First Filed Value   Admission Height 175.3 cm (69\") Documented at 05/14/2024 2100   Admission Weight 95.7 kg (210 lb 14.4 oz) Documented at 05/14/2024 2100            General Appearance:    Alert, cooperative, in no acute distress   Head:    Normocephalic, without obvious abnormality, atraumatic       Neck/Lymph   No adenopathy, supple, no thyromegaly, no carotid bruit, no    JVD   Lungs:     Clear to auscultation bilaterally, no wheezes, rales, or     rhonchi    Cardiac:    Normal rate, regular rhythm, no murmur, no rub, no gallop   Chest Wall:    No abnormalities observed   GI:     Normal bowel sounds, soft, nontender, nondistended,            no rebound tenderness   Extremities:   No cyanosis, clubbing, or edema   Circulatory/Peripheral Vascular :   Pulses palpable and equal bilaterally   Integumentary:   " LACTATION NOTE - MOTHER      Evaluation Type: Outpatient Initial    Problems identified  Problems identified: Knowledge deficit;Milk supply WNL; Unable to acheive sustained latch  Problems Identified Other: Joe Beltre has been exclusively pumping because her infant is having BF diffulty and not latching. She came to the [facility] for assistance with BF, however she is comfortable with pumping and bottle feeding if infant is not able to BF. Assisted with BF using a Nipple Shield. Infant has a disorganized suck and has suspected oral restrictions which may be contributing to infant's latch difficulty.  is recommending infant be evaluated by a speech/myofunctional therapist. Pt scored 7/30 on her EPDS score. She interacted well with her baby and asked appropriate question. She declined the need for LO to contact her for emotional support and stated that she felt better after [facility] consult. Reviewed handout on PMAD and discussed cradle talk, BF and nurturing mom's support groups. A follow up appointment is scheduled for 23. Enc to call  prn for BF/milk supply support. Maternal history  Maternal history: Anemia  Other/comment:  delivery, Infant born at 40.5 wks, weighed 9 lbs 1.7 oz, needed suctioning and oxygen initially after delivery. Hx HPV    Breastfeeding goal  Breastfeeding goal: To maintain breast milk feeding per patient goal    Maternal Assessment  Bilateral Breasts: Full;Symmetrical  Bilateral Nipples: Slightly everted/short (Easily expresses breast milk)  Left Breast:  (Pt reports L breast produces more breast milk when pumping)  Prior breastfeeding experience (comment below): Multip; Unsuccessful  Breastfeeding Assistance: Breast exam provided with permission;Hand expression provided with permission;Breastfeeding assistance provided with permission;Pumping assistance provided with permission    Pain assessment  Pain, additional: Pain w/pumping (Initial minute of pumping)  Pain scale comment: Denied discomfort with BF/pumping  Treatment of Sore Nipples: Expressed breast milk (Turned initial suction setting on pump to a lower strength and denied discomfort with pumping.)    Guidelines for use of:  Breast pump type: Spectra  Current use of pump[de-identified] q 3-4 hours  Suggested use of pump: Pump 8-12X/24hr;Pump if infant is not latching to breast;Pump each time a supplement is offered;Pump after nursing if a nipple shield is used  Reported pumping volumes (ml): 90  Post-feed pumped volume: 93  Other (comment): Reviewed proper fit of flanges, adjusting pump strength to her comfort level. Pt reported her L breast produces more milk than her R breast. Suggested adding an additional 5 mins of pumping for hr R breast using the hands on pumping technique. No bleeding or rash. Normal temperature                amLODIPine, 10 mg, Oral, Daily  aspirin, 81 mg, Oral, Daily  carvedilol, 12.5 mg, Oral, Q12H  Hdwfsxz-Mjlvv-Kcoltcej-TenofAF, 1 tablet, Oral, Daily  [Held by provider] empagliflozin, 25 mg, Oral, Daily  enoxaparin, 1 mg/kg, Subcutaneous, Q12H  furosemide, 20 mg, Oral, Daily  [Held by provider] glipizide, 10 mg, Oral, Q12H  insulin glargine, 25 Units, Subcutaneous, Nightly  insulin regular, 2-7 Units, Subcutaneous, Q6H  magnesium oxide, 400 mg, Oral, Daily  pantoprazole, 40 mg, Oral, Q AM  rosuvastatin, 20 mg, Oral, Daily  sacubitril-valsartan, 1 tablet, Oral, Q12H  sertraline, 50 mg, Oral, Daily  sodium chloride, 10 mL, Intravenous, Q12H  spironolactone, 25 mg, Oral, Daily  warfarin, 15 mg, Oral, Once        Pharmacy to dose warfarin,   sodium chloride, 75 mL/hr, Last Rate: 75 mL/hr (05/15/24 1530)        Results Review:    Results from last 7 days   Lab Units 05/18/24  0510   SODIUM mmol/L 137   POTASSIUM mmol/L 3.7   CHLORIDE mmol/L 106   CO2 mmol/L 21.3*   BUN mg/dL 9   CREATININE mg/dL 0.95   GLUCOSE mg/dL 76   CALCIUM mg/dL 8.8     Results from last 7 days   Lab Units 05/14/24  1832   HSTROP T ng/L 26*     Results from last 7 days   Lab Units 05/18/24  0510   WBC 10*3/mm3 3.37*   HEMOGLOBIN g/dL 15.3   HEMATOCRIT % 46.4   PLATELETS 10*3/mm3 168     Results from last 7 days   Lab Units 05/18/24  0510 05/17/24  1431 05/17/24  0509 05/15/24  1827 05/14/24  1832   INR  1.61* 1.45* 3.76*   < > 1.05   APTT seconds  --   --   --   --  25.3    < > = values in this interval not displayed.     Results from last 7 days   Lab Units 05/14/24  1832   CHOLESTEROL mg/dL 193     Results from last 7 days   Lab Units 05/14/24  1832   MAGNESIUM mg/dL 1.7     Results from last 7 days   Lab Units 05/14/24  1832   CHOLESTEROL mg/dL 193   TRIGLYCERIDES mg/dL 104   HDL CHOL mg/dL 38*   LDL CHOL mg/dL 136*     @LABRCNT(bnp)@  I reviewed the patient's new clinical results.  I  personally viewed and interpreted the patient's EKG/Telemetry data        5/15/24    Left ventricular systolic function is moderately decreased. Calculated left ventricular EF = 36.9%    Left ventricular wall thickness is consistent with severe concentric hypertrophy.    The following left ventricular wall segments are hypokinetic: mid anterior. The following left ventricular wall segments are akinetic: apical anterior and apex.    Left ventricular diastolic function is consistent with (grade I) impaired relaxation.    The left atrial cavity is moderate to severely dilated.    Left atrial volume is severely increased.    Mild mitral valve regurgitation is present.    Saline test results are negative.     Stress   Myocardial perfusion imaging indicates a moderate-sized infarct located in the inferior wall with mild-to-moderate isi-infarct ischemia.    Abnormal LV wall motion consistent with severe hypokinesis of the inferior wall.    Left ventricular ejection fraction is moderately reduced (Calculated EF = 34%).    Assessment & Plan   1.  Acute CVA: MRI of the demonstrated multiple bilateral areas of acute infarction consistent with embolic source  2.  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  Coronary disease with prior stenting to D1 in 2019. Patient suffered STEMI at that time.  Left heart cath in 2019 showed  4.  Ischemic cardiomyopathy: EF 36% (previous EF 48% 9/2023).  On carvedilol, Entresto 49/51, lasix, spironolactone, Jardiance.   5.   Diabetes type 2: HbA1c 10.  Insulin  6.  Hypercholesterolemia with goal LDL less than 70: .  On rosuvastatin  7.  Hypertension:    -I would not recommend catheterization at this time.  I would recommend continuing goal-directed medical therapy for his cardiomyopathy and considering catheterization depending on how he recovers.  We will see him in a month and reassess.  We could consider coronary angiography at that time.  I will sign off.

## 2024-08-26 ENCOUNTER — MED REC SCAN ONLY (OUTPATIENT)
Facility: CLINIC | Age: 34
End: 2024-08-26

## 2025-01-06 DIAGNOSIS — Z30.011 ENCOUNTER FOR INITIAL PRESCRIPTION OF CONTRACEPTIVE PILLS: ICD-10-CM

## 2025-01-10 RX ORDER — NORETHINDRONE 0.35 MG/1
0.35 TABLET ORAL DAILY
Qty: 84 TABLET | Refills: 3 | OUTPATIENT
Start: 2025-01-10

## 2025-03-04 ENCOUNTER — HOSPITAL ENCOUNTER (OUTPATIENT)
Age: 35
Discharge: HOME | End: 2025-03-04
Payer: COMMERCIAL

## 2025-03-04 VITALS
SYSTOLIC BLOOD PRESSURE: 128 MMHG | HEART RATE: 60 BPM | RESPIRATION RATE: 20 BRPM | HEIGHT: 63 IN | OXYGEN SATURATION: 97 % | TEMPERATURE: 98 F | DIASTOLIC BLOOD PRESSURE: 78 MMHG | WEIGHT: 185 LBS | BODY MASS INDEX: 32.78 KG/M2

## 2025-03-04 DIAGNOSIS — R05.9 COUGH: ICD-10-CM

## 2025-03-04 DIAGNOSIS — J06.9 VIRAL URI WITH COUGH: Primary | ICD-10-CM

## 2025-03-04 LAB
POCT INFLUENZA A: NEGATIVE
POCT INFLUENZA B: NEGATIVE
S PYO AG THROAT QL: NEGATIVE
SARS-COV-2 RNA RESP QL NAA+PROBE: NOT DETECTED

## 2025-03-04 PROCEDURE — 87502 INFLUENZA DNA AMP PROBE: CPT | Performed by: NURSE PRACTITIONER

## 2025-03-04 PROCEDURE — U0002 COVID-19 LAB TEST NON-CDC: HCPCS | Performed by: NURSE PRACTITIONER

## 2025-03-04 PROCEDURE — 99214 OFFICE O/P EST MOD 30 MIN: CPT | Performed by: NURSE PRACTITIONER

## 2025-03-04 PROCEDURE — 87880 STREP A ASSAY W/OPTIC: CPT | Performed by: NURSE PRACTITIONER

## 2025-03-04 RX ORDER — BENZONATATE 100 MG/1
100 CAPSULE ORAL 3 TIMES DAILY PRN
Qty: 30 CAPSULE | Refills: 0 | Status: SHIPPED | OUTPATIENT
Start: 2025-03-04 | End: 2025-04-03

## 2025-03-04 NOTE — DISCHARGE INSTRUCTIONS
Ibuprofen 400-600 mg every 6 hours as needed - take with food.  Tessalon: may take 1 tab up to three times a day IF NEEDED for coughing.   Gargle with warm salt water (1tsp salt in 8oz of water)  Lots of fluids  Hot lemon tea with honey  Sore throat drops/sprays as needed (Halls Sugar Free Sore Throat Drops/Chloraseptic/Cepacol)  Follow up with PCP if symptoms not improving in a week.

## 2025-03-04 NOTE — ED PROVIDER NOTES
Patient Seen in: [facility]      History     Chief Complaint   Patient presents with    Sore Throat    Cough/URI    Nasal Congestion     Stated Complaint: losing voice, sore throat    Subjective:   34-year-old female presents with complaint of cough, sore throat, hoarseness that began 3 days ago.  Family member at home with viral illness.  OTCs include DayQuil/NyQuil.    Patient denies fever, chills, difficulty swallowing, shortness of breath, nausea, vomiting, diarrhea.      The history is provided by the patient.         Objective:     Past Medical History:    Human papilloma virus infection    history of              Past Surgical History:   Procedure Laterality Date          Colposcopy, cervix w/upper adjacent vagina; w/biopsy(s), cervix  ,    cervix biopsy neg, endocervix curettage neg                Social History     Socioeconomic History    Marital status:    Tobacco Use    Smoking status: Never    Smokeless tobacco: Never   Vaping Use    Vaping status: Never Used   Substance and Sexual Activity    Alcohol use: Not Currently     Alcohol/week: 1.0 standard drink of alcohol     Types: 1 Standard drinks or equivalent per week     Comment: weekly    Drug use: No    Sexual activity: Yes     Partners: Male     Comment: trying for preg     Social Drivers of Health     Food Insecurity: No Food Insecurity (2023)    Food Insecurity     Food Insecurity: Never true   Transportation Needs: No Transportation Needs (2023)    Transportation Needs     Lack of Transportation: No   Housing Stability: Low Risk  (2023)    Housing Stability     Housing Instability: No              Review of Systems   Constitutional:  Negative for chills and fever.   HENT:  Positive for postnasal drip and voice change (hoarse). Negative for congestion, sinus pressure, sinus pain and trouble swallowing.    Respiratory:  Positive for cough. Negative for shortness of breath and wheezing.     Cardiovascular:  Negative for chest pain.   Gastrointestinal:  Negative for diarrhea, nausea and vomiting.       Positive for stated complaint: losing voice, sore throat  Other systems are as noted in HPI.  Constitutional and vital signs reviewed.      All other systems reviewed and negative except as noted above.    Physical Exam     ED Triage Vitals [03/04/25 1106]   /78   Pulse 60   Resp 20   Temp 98.2 °F (36.8 °C)   Temp src Oral   SpO2 97 %   O2 Device None (Room air)       Current Vitals:   Vital Signs  BP: 128/78  Pulse: 60  Resp: 20  Temp: 98.2 °F (36.8 °C)  Temp src: Oral    Oxygen Therapy  SpO2: 97 %  O2 Device: None (Room air)        Physical Exam  Vitals and nursing note reviewed.   Constitutional:       General: She is not in acute distress.     Appearance: She is well-developed. She is not ill-appearing, toxic-appearing or diaphoretic.   HENT:      Head: Normocephalic.      Right Ear: Tympanic membrane normal.      Left Ear: Tympanic membrane normal.      Nose: No congestion.      Mouth/Throat:      Mouth: Mucous membranes are moist.      Pharynx: Uvula midline. Posterior oropharyngeal erythema present. No pharyngeal swelling, oropharyngeal exudate or uvula swelling.      Tonsils: No tonsillar exudate or tonsillar abscesses.   Eyes:      Conjunctiva/sclera: Conjunctivae normal.   Cardiovascular:      Rate and Rhythm: Normal rate and regular rhythm.      Heart sounds: Normal heart sounds.   Pulmonary:      Effort: Pulmonary effort is normal. No respiratory distress.      Breath sounds: Normal breath sounds. No stridor. No wheezing, rhonchi or rales.   Musculoskeletal:      Cervical back: Normal range of motion and neck supple.   Lymphadenopathy:      Cervical: No cervical adenopathy.   Skin:     General: Skin is warm and dry.      Findings: No rash.   Neurological:      Mental Status: She is alert.   Psychiatric:         Mood and Affect: Mood normal.             ED Course     Labs Reviewed    POCT RAPID STREP - Normal   POCT FLU TEST - Normal    Narrative:     This assay is a rapid molecular in vitro test utilizing nucleic acid amplification of influenza A and B viral RNA.   RAPID SARS-COV-2 BY PCR - Normal        MDM      Medical Decision Making  34-year-old female presents with complaint of cough, sore throat, hoarseness that began 3 days ago.  Did differential diagnoses include strep, COVID, influenza, viral pharyngitis.  On exam patient is well-appearing, vitals are normal, lungs clear bilaterally.  Strep, COVID, influenza are all negative.  Likely viral illness-supportive cares discussed (see AVS).  Rx Tessalon sent to preferred pharmacy.  Follow-up with PCP or RTC if not improving as anticipated in the next week, sooner if worsening.  Patient agreeable to plan.    Amount and/or Complexity of Data Reviewed  External Data Reviewed: labs and notes.  Labs: ordered.    Risk  OTC drugs.  Prescription drug management.        Disposition and Plan     Clinical Impression:  1. Viral URI with cough    2. Cough         Disposition:  Discharge  3/4/2025 11:35 am    Follow-up:  Guillermo Og MD  [address] [facility]  [address] [place] [address]  [phone]      If symptoms worsen          Medications Prescribed:  Discharge Medication List as of 3/4/2025 11:35 AM        START taking these medications    Details   benzonatate 100 MG Oral Cap Take 1 capsule (100 mg total) by mouth 3 (three) times daily as needed for cough., Normal, Disp-30 capsule, R-0                 Supplementary Documentation:

## 2025-03-09 ENCOUNTER — HOSPITAL ENCOUNTER (OUTPATIENT)
Age: 35
Discharge: HOME | End: 2025-03-09
Payer: COMMERCIAL

## 2025-03-09 ENCOUNTER — APPOINTMENT (OUTPATIENT)
Dept: GENERAL RADIOLOGY | Age: 35
End: 2025-03-09
Attending: NURSE PRACTITIONER
Payer: COMMERCIAL

## 2025-03-09 VITALS
RESPIRATION RATE: 18 BRPM | HEART RATE: 78 BPM | OXYGEN SATURATION: 96 % | SYSTOLIC BLOOD PRESSURE: 121 MMHG | DIASTOLIC BLOOD PRESSURE: 72 MMHG | TEMPERATURE: 99 F

## 2025-03-09 DIAGNOSIS — J06.9 VIRAL UPPER RESPIRATORY TRACT INFECTION: ICD-10-CM

## 2025-03-09 DIAGNOSIS — R05.1 ACUTE COUGH: Primary | ICD-10-CM

## 2025-03-09 PROCEDURE — 71046 X-RAY EXAM CHEST 2 VIEWS: CPT | Performed by: NURSE PRACTITIONER

## 2025-03-09 PROCEDURE — 99214 OFFICE O/P EST MOD 30 MIN: CPT | Performed by: NURSE PRACTITIONER

## 2025-03-09 RX ORDER — ALBUTEROL SULFATE 90 UG/1
2 INHALANT RESPIRATORY (INHALATION) EVERY 4 HOURS PRN
Qty: 1 EACH | Refills: 0 | Status: SHIPPED | OUTPATIENT
Start: 2025-03-09 | End: 2025-04-08

## 2025-03-09 RX ORDER — BENZONATATE 100 MG/1
100 CAPSULE ORAL 3 TIMES DAILY PRN
Qty: 30 CAPSULE | Refills: 0 | Status: SHIPPED | OUTPATIENT
Start: 2025-03-09 | End: 2025-04-08

## 2025-03-09 RX ORDER — METHYLPREDNISOLONE 4 MG/1
TABLET ORAL
Qty: 1 EACH | Refills: 0 | Status: SHIPPED | OUTPATIENT
Start: 2025-03-09

## 2025-03-09 NOTE — ED INITIAL ASSESSMENT (HPI)
Patient here with c/o fever since Thursday and cough started last Saturday. Sore throat comes and goes.    Patient was seen here on 03/04/25 and tested negative for strep, Covid and flu. Low grade fever started.    80

## 2025-03-09 NOTE — ED PROVIDER NOTES
Patient Seen in: [facility]      History     Chief Complaint   Patient presents with    Cough     Stated Complaint: cough, runny nose, sore throat    Subjective:   HPI      34-year-old female presents today with complaints of cough, runny nose and sore throat for about 8 days.  Patient states she has some shortness of breath associated with the cough.  Patient states that she was exposed to her brother with pneumonia 1 month ago.    Objective:     Past Medical History:    Human papilloma virus infection    history of              Past Surgical History:   Procedure Laterality Date          Colposcopy, cervix w/upper adjacent vagina; w/biopsy(s), cervix  ,    cervix biopsy neg, endocervix curettage neg                No pertinent social history.            Review of Systems   Constitutional: Negative.    HENT:  Positive for sore throat.    Eyes: Negative.    Respiratory:  Positive for cough and shortness of breath.    Cardiovascular: Negative.    Gastrointestinal: Negative.    Endocrine: Negative.    Genitourinary: Negative.    Musculoskeletal: Negative.    Skin: Negative.    Allergic/Immunologic: Negative.    Neurological: Negative.    Hematological: Negative.    Psychiatric/Behavioral: Negative.         Positive for stated complaint: cough, runny nose, sore throat  Other systems are as noted in HPI.  Constitutional and vital signs reviewed.      All other systems reviewed and negative except as noted above.    Physical Exam     ED Triage Vitals   BP 25 0953 121/72   Pulse 25 0953 78   Resp 25 0953 18   Temp 25 0953 98.6 °F (37 °C)   Temp src 25 0953 Oral   SpO2 25 0953 96 %   O2 Device 25 0952 None (Room air)       Current Vitals:   Vital Signs  BP: 121/72  Pulse: 78  Resp: 18  Temp: 98.6 °F (37 °C)  Temp src: Oral    Oxygen Therapy  SpO2: 96 %  O2 Device: None (Room air)        Physical Exam  Vitals and nursing note reviewed.    Constitutional:       Appearance: Normal appearance. She is normal weight.      Comments: Alert nontoxic 34-year-old female sitting on exam table speaking in full sentences.  No respiratory distress.   HENT:      Head: Normocephalic.      Right Ear: Tympanic membrane, ear canal and external ear normal.      Left Ear: Tympanic membrane, ear canal and external ear normal.      Nose: Nose normal.      Mouth/Throat:      Mouth: Mucous membranes are moist.      Pharynx: Oropharynx is clear. Posterior oropharyngeal erythema present.      Comments: Postnasal drip appreciated.  Eyes:      Extraocular Movements: Extraocular movements intact.      Conjunctiva/sclera: Conjunctivae normal.      Pupils: Pupils are equal, round, and reactive to light.   Cardiovascular:      Rate and Rhythm: Normal rate and regular rhythm.      Pulses: Normal pulses.      Heart sounds: Normal heart sounds.   Pulmonary:      Effort: Pulmonary effort is normal.      Comments: Rhonchorous lung sounds appreciated to the left side.  Musculoskeletal:      Cervical back: Normal range of motion and neck supple.   Neurological:      General: No focal deficit present.      Mental Status: She is alert.   Psychiatric:         Mood and Affect: Mood normal.           ED Course   Labs Reviewed - No data to display                MDM      34-year-old female presents today with complaints of cough, runny nose and sore throat for about 8 days.  Patient states she has some shortness of breath associated with the cough.  Patient states that she was exposed to her brother with pneumonia 1 month ago.  Vital signs: Please see EMR.  Physical exam: Please see exam.  Differential diagnosis: Pneumonia, bronchitis, URI, strep throat.  XR CHEST PA + LAT CHEST (CPT=71046)    Result Date: 3/9/2025  CONCLUSION:  No acute process.   LOCATION:  [facility]   Dictated by (CST): Gerald Hunter MD on 3/09/2025 at 10:16 AM     Finalized by (CST): Gerald Hunter MD on 3/09/2025 at 10:16  AM      Based on physical exam and HPI will diagnose upper respiratory tract infection.  Will prescribe a short course of steroid, Tessalon and ProAir.  Patient instructed to increase her water intake and replace her toothbrush.  ED precautions given.        Medical Decision Making  34-year-old female presents today with complaints of cough, runny nose and sore throat for about 8 days.  Patient states she has some shortness of breath associated with the cough.  Patient states that she was exposed to her brother with pneumonia 1 month ago.    Problems Addressed:  Acute cough: acute illness or injury  Viral upper respiratory tract infection: acute illness or injury    Amount and/or Complexity of Data Reviewed  Radiology: ordered. Decision-making details documented in ED Course.     Details: XR CHEST PA + LAT CHEST (CPT=71046)    Result Date: 3/9/2025  CONCLUSION:  No acute process.   LOCATION:  [facility]   Dictated by (CST): Gerald Hunter MD on 3/09/2025 at 10:16 AM     Finalized by (CST): Gerald Hunter MD on 3/09/2025 at 10:16 AM           Risk  OTC drugs.  Prescription drug management.        Disposition and Plan     Clinical Impression:  1. Acute cough    2. Viral upper respiratory tract infection         Disposition:  Discharge  3/9/2025 10:28 am    Follow-up:  Guillermo Og MD  [facility]  [address] [place] [address]  [phone]    In 1 week            Medications Prescribed:  Current Discharge Medication List        START taking these medications    Details   methylPREDNISolone (MEDROL) 4 MG Oral Tablet Therapy Pack Dosepack: take as directed  Qty: 1 each, Refills: 0      !! benzonatate 100 MG Oral Cap Take 1 capsule (100 mg total) by mouth 3 (three) times daily as needed for cough.  Qty: 30 capsule, Refills: 0      albuterol 108 (90 Base) MCG/ACT Inhalation Aero Soln Inhale 2 puffs into the lungs every 4 (four) hours as needed for Wheezing.  Qty: 1 each, Refills: 0       !! - Potential duplicate  medications found. Please discuss with provider.              Supplementary Documentation:

## 2025-03-11 ENCOUNTER — OFFICE VISIT (OUTPATIENT)
Facility: CLINIC | Age: 35
End: 2025-03-11
Payer: COMMERCIAL

## 2025-03-11 VITALS
DIASTOLIC BLOOD PRESSURE: 84 MMHG | HEIGHT: 63 IN | BODY MASS INDEX: 34.37 KG/M2 | WEIGHT: 194 LBS | SYSTOLIC BLOOD PRESSURE: 116 MMHG

## 2025-03-11 DIAGNOSIS — Z01.419 ENCOUNTER FOR WELL WOMAN EXAM WITH ROUTINE GYNECOLOGICAL EXAM: Primary | ICD-10-CM

## 2025-03-11 DIAGNOSIS — Z31.69 ENCOUNTER FOR PRECONCEPTION CONSULTATION: ICD-10-CM

## 2025-03-11 DIAGNOSIS — Z12.4 SCREENING FOR MALIGNANT NEOPLASM OF CERVIX: ICD-10-CM

## 2025-03-11 PROCEDURE — 87624 HPV HI-RISK TYP POOLED RSLT: CPT

## 2025-03-11 PROCEDURE — 99395 PREV VISIT EST AGE 18-39: CPT

## 2025-03-11 PROCEDURE — 88175 CYTOPATH C/V AUTO FLUID REDO: CPT

## 2025-03-11 NOTE — PROGRESS NOTES
GYN H&P     Genetic questionnaire reviewed with the patient and she will be referred for genetic counseling if the questionnaire had any positive results.    The [facility] Health intake form was also reviewed regarding contraception, menstrual periods, urinary health, and vaginal / sexual health    3/11/2025  4:46 PM    Chief Complaint   Patient presents with    Physical     No concerns       HPI: Sabrina is a 34 year old  Patient's last menstrual period was 2025 (exact date).  (contraception: OCP) here for her annual gyn exam.     She has no complaints. Menses are regular. Denies any pelvic or breast complaints.   Satisfied with current contraception. Was prescribed bupropion for postpartum depression after she stopped breastfeeding - was taking the medication x6 weeks and felt much better so discontinued. Denies any further mood issues. Plans and stopping birth control at the end of March in preparation for pregnancy in May. Still currently taking prenatal vitamins.    -Wilmer SANCHEZ    Previous encounters and chart reviewed.     OB History    Para Term  AB Living   2 2 2     2   SAB IAB Ectopic Multiple Live Births         0 2      # Outcome Date GA Lbr Remi/2nd Weight Sex Type Anes PTL Lv   2 Term 23 40w5d 08:00 / 02:26 9 lb 1.7 oz (4.13 kg) F VAGINAL CARLI EPI N GREG      Complications: Other - see comments   1 Term 11 39w0d  5 lb 11 oz (2.58 kg) F Caesarean   GREG       GYN hx:   Menarche: 14  Period Cycle (Days): 27-28  Period Duration (Days): 3-4  Period Flow: light  Use of Birth Control (if yes, specify type): OCP  Hx Prior Abnormal Pap: Yes  Pap Date: 21  Pap Result Notes: 21 Neg; 2018 NEG        (2017, neg,neg, 2016 LGSIL,neg  2015 ASCUS  2014 ASCUS,pos 16&18neg)  Follow Up Recommendation: due today      Past Medical History:    Human papilloma virus infection    history of     Past Surgical History:   Procedure Laterality Date           Colposcopy, cervix w/upper adjacent vagina; w/biopsy(s), cervix  ,    cervix biopsy neg, endocervix curettage neg     Allergies[1]  Medications Ordered Prior to Encounter[2]  No family history on file.  Social History     Socioeconomic History    Marital status:      Spouse name: Not on file    Number of children: Not on file    Years of education: Not on file    Highest education level: Not on file   Occupational History    Not on file   Tobacco Use    Smoking status: Never    Smokeless tobacco: Never   Vaping Use    Vaping status: Never Used   Substance and Sexual Activity    Alcohol use: Not Currently     Alcohol/week: 1.0 standard drink of alcohol     Types: 1 Standard drinks or equivalent per week     Comment: weekly    Drug use: No    Sexual activity: Yes     Partners: Male     Comment: trying for preg   Other Topics Concern    Not on file   Social History Narrative    Not on file     Social Drivers of Health     Food Insecurity: No Food Insecurity (3/11/2025)    NCSS - Food Insecurity     Worried About Running Out of Food in the Last Year: No     Ran Out of Food in the Last Year: No   Transportation Needs: No Transportation Needs (3/11/2025)    NCSS - Transportation     Lack of Transportation: No   Stress: No Stress Concern Present (2023)    Stress     Feeling of Stress : No   Housing Stability: Not At Risk (3/11/2025)    NCSS - Housing/Utilities     Has Housing: Yes     Worried About Losing Housing: No     Unable to Get Utilities: No       ROS:     Review of Systems:  General: denies fevers, chills, fatigue and malaise.   Eyes: no visual changes, denies headaches  ENT: no complaints, denies earaches, runny nose, epistaxis, throat pain or sore throat  Respiratory: denies SOB, dyspnea, cough or wheezing  Cardiovascular: denies chest pain, palpitations, exercise intolerance   GI: denies abdominal pain, diarrhea, constipation  : no complaints, denies dysuria, increased  urinary frequency. Menses regular, no dysmenorrhea, no menorrhagia, no dyspareunia   Hematological/lymphatic: denies history of excessive bleeding or bruising, denies dizziness, lightheadedness.   Breast: denies rashes, skin changes, pain, lumps or discharge   Psychiatric: denies depression, changes in sleep patterns, anxiety  Endocrine: denies hot or cold intolerance, mood changes   Neurological: denies changes in sight, smell, hearing or taste. Denies seizures or tremors  Immunological: denies allergies, denies anaphylaxis, or swollen lymph nodes  Musculoskeletal: denies joint pain, morning stiffness, decreased range of motion         O /84   Ht 63\"   Wt 194 lb (88 kg)   LMP 03/08/2025 (Exact Date)   BMI 34.37 kg/m²         Wt Readings from Last 6 Encounters:   03/11/25 194 lb (88 kg)   03/04/25 185 lb (83.9 kg)   08/22/24 185 lb (83.9 kg)   12/26/23 186 lb (84.4 kg)   11/11/23 212 lb (96.2 kg)   11/10/23 212 lb (96.2 kg)     Exam:   GENERAL: well developed, well nourished, in no apparent distress, oriented.  SKIN: no rashes, no suspicious lesions  HEENT: normal  NECK: supple; no thyromegaly, no adenopathy  LUNGS: clear to auscultation  CARDIOVASCULAR: normal S1, S2, RRR  BREASTS: soft, nontender, no palpable masses or nodes, no nipple discharge, no skin changes, no axillary adenopathy  ABDOMEN: no scars,  soft, non distended; non tender, no masses  PELVIC: External Genitalia: Normal appearing, no lesions.    Vagina: normal pink mucosa, no lesions, normal clear discharge.    Bladder well supported.  No  anterior or posterior hernias    Cervix: multiparous, no lesions , No CMT     Uterus: AVAF, mobile, non tender, normal size    Adnexa: non tender, no masses, normal size    Rectal: deferred  EXTREMITIES:  non tender without edema        A/P: Patient is 34 year old female with no complaints. Here for well woman exam.   Pap done. Stop taking OCP by the end of the month to allow nomal menses and prepare for  pregnancy. Continue taking prenatal vitamins.         Patient counseled on:    Diet/exercise.      Self Breast Exams     Safe sex practices / and living environment     Vaccines:  Annual Flu, Tdap +/- Gardasil recommended (up to 45 yrs).      Pneumococcal at 65 yrs old, Shingles at 60 yrs old          Pap: neg/neg - Year: done  GC/Chlamydia:  2023 neg  Mammogram:  na  Dexa:  na  Colonoscopy / Cologuard:   n/a  Lipid / Cholesterol:  2022    Cholesterol, Total  <200 mg/dL 188   Comment: Desirable  <200 mg/dL  Borderline  200-239 mg/dL  High      >=240 mg/dL     HDL Cholesterol  40 - 59 mg/dL 57   Comment: Interpretive Information:  An HDL cholesterol <40 mg/dL is low and constitutes a coronary heart disease risk factor. An HDL cholesterol >60 mg/dL is a negative risk factor for coronary heart disease.     Triglycerides  30 - 149 mg/dL 88   Comment: Reference interval for fasting triglycerides  Desirable: <150 mg/dL  Borderline: 150-199 mg/dL  High: 200-499 mg/dL  Very High: >=500 mg/dL       LDL Cholesterol  <100 mg/dL 115 High    Comment: Desirable <100 mg/dL  Borderline 100-129 mg/dL  High     >=130mg/dL     VLDL  0 - 30 mg/dL 15   Non HDL Chol  <130 mg/dL 131 High          Meds This Visit:    Requested Prescriptions      No prescriptions requested or ordered in this encounter       1. Encounter for well woman exam with routine gynecological exam  - ThinPrep PAP Smear B; Future  - Hpv High Risk , Thin Prep Collect; Future    2. Screening for malignant neoplasm of cervix    3. Encounter for preconception consultation    Patient made aware that this practice will no longer be seeing OB patient starting in April - provided with list of other [facility] providers    Return in about 1 year (around 3/11/2026) for Well Woman Exam.    Alexia Donaldson, APRN   3/11/2025  4:46 PM       This note was created by [employer] voice recognition. Errors in content may be related to improper recognition by the system; efforts to review  and correct have been done but errors may still exist. Please contact me with any questions.    Note to patient and family   The 21st Century Cures Act makes medical notes available to patients in the interest of transparency.  However, please be advised that this is a medical document.  It is intended as [id number]-ps-[id number] communication.  It is written in medical language which may contain abbreviations or verbiage that are technical and unfamiliar.  It may appear blunt or direct.  Medical documents are intended to carry relevant information, facts as evident, and the clinical opinion of the practitioner.           [1] No Known Allergies  [2]   Current Outpatient Medications on File Prior to Visit   Medication Sig Dispense Refill    methylPREDNISolone (MEDROL) 4 MG Oral Tablet Therapy Pack Dosepack: take as directed 1 each 0    benzonatate 100 MG Oral Cap Take 1 capsule (100 mg total) by mouth 3 (three) times daily as needed for cough. 30 capsule 0    albuterol 108 (90 Base) MCG/ACT Inhalation Aero Soln Inhale 2 puffs into the lungs every 4 (four) hours as needed for Wheezing. 1 each 0    benzonatate 100 MG Oral Cap Take 1 capsule (100 mg total) by mouth 3 (three) times daily as needed for cough. 30 capsule 0    PRENATAL VITAMIN TABS   OR 1 tablet daily 90 Tab 3    Cholecalciferol (VITAMIN D3) 25 MCG (1000 UT) Oral Cap Take 1 tablet by mouth daily.      buPROPion  MG Oral Tablet 24 Hr Take 1 tablet (150 mg total) by mouth daily. 30 tablet 2    Norethindrone (ORTHO MICRONOR) 0.35 MG Oral Tab Take 1 tablet (0.35 mg total) by mouth daily. 84 tablet 3    ibuprofen 600 MG Oral Tab Take 1 tablet (600 mg total) by mouth every 6 (six) hours as needed for Pain. (Patient not taking: Reported on 11/21/2023) 20 tablet 1     No current facility-administered medications on file prior to visit.

## 2025-03-12 LAB — HPV E6+E7 MRNA CVX QL NAA+PROBE: NEGATIVE

## 2025-03-26 ENCOUNTER — E-VISIT (OUTPATIENT)
Dept: TELEHEALTH | Age: 35
End: 2025-03-26
Payer: COMMERCIAL

## 2025-03-26 DIAGNOSIS — R52 PAINFUL COUGH: ICD-10-CM

## 2025-03-26 DIAGNOSIS — J01.90 SUBACUTE RHINOSINUSITIS: Primary | ICD-10-CM

## 2025-03-26 DIAGNOSIS — R05.8 PAINFUL COUGH: ICD-10-CM

## 2025-03-27 PROCEDURE — 99422 OL DIG E/M SVC 11-20 MIN: CPT | Performed by: PHYSICIAN ASSISTANT

## 2025-03-27 RX ORDER — DOXYCYCLINE 100 MG/1
100 CAPSULE ORAL 2 TIMES DAILY
Qty: 14 CAPSULE | Refills: 0 | Status: SHIPPED | OUTPATIENT
Start: 2025-03-27 | End: 2025-04-03

## 2025-03-27 NOTE — PROGRESS NOTES
Sabrina Carter is a 34 year old female who initiated e-visit care today.    HPI:   See answers to questionnaire submission     Current Outpatient Medications   Medication Sig Dispense Refill           benzonatate 100 MG Oral Cap Take 1 capsule (100 mg total) by mouth 3 (three) times daily as needed for cough. 30 capsule 0    albuterol 108 (90 Base) MCG/ACT Inhalation Aero Soln Inhale 2 puffs into the lungs every 4 (four) hours as needed for Wheezing. 1 each 0    Norethindrone (ORTHO MICRONOR) 0.35 MG Oral Tab Take 1 tablet (0.35 mg total) by mouth daily. 84 tablet 3    PRENATAL VITAMIN TABS   OR 1 tablet daily 90 Tab 3      Past Medical History:    Human papilloma virus infection    history of      Past Surgical History:   Procedure Laterality Date          Colposcopy, cervix w/upper adjacent vagina; w/biopsy(s), cervix  ,    cervix biopsy neg, endocervix curettage neg      No family history on file.   Social History:  Social History     Socioeconomic History    Marital status:    Tobacco Use    Smoking status: Never    Smokeless tobacco: Never   Vaping Use    Vaping status: Never Used   Substance and Sexual Activity    Alcohol use: Not Currently     Alcohol/week: 1.0 standard drink of alcohol     Types: 1 Standard drinks or equivalent per week     Comment: weekly    Drug use: No    Sexual activity: Yes     Partners: Male     Comment: trying for preg     Social Drivers of Health     Food Insecurity: No Food Insecurity (3/11/2025)    NCSS - Food Insecurity     Worried About Running Out of Food in the Last Year: No     Ran Out of Food in the Last Year: No   Transportation Needs: No Transportation Needs (3/11/2025)    NCSS - Transportation     Lack of Transportation: No   Stress: No Stress Concern Present (2023)    Stress     Feeling of Stress : No   Housing Stability: Not At Risk (3/11/2025)    NCSS - Housing/Utilities     Has Housing: Yes     Worried About Losing Housing: No      Unable to Get Utilities: No         ASSESSMENT AND PLAN:       Diagnoses and all orders for this visit:    Subacute rhinosinusitis  -     doxycycline 100 MG Oral Cap; Take 1 capsule (100 mg total) by mouth 2 (two) times daily for 7 days.    Painful cough    Restart albuterol and use q6 hrs while awake x 5 days min  600 mg ibuprofen with food q6 hrs while awake x 5 days min  Close follow up in person if not improving in next 48-72 hours      Duration of  the service:  12 minutes      See [employer] message exchange and Patient Instructions for Comfort Care and patient education.

## 2025-03-31 ENCOUNTER — APPOINTMENT (OUTPATIENT)
Dept: GENERAL RADIOLOGY | Age: 35
End: 2025-03-31
Attending: NURSE PRACTITIONER
Payer: COMMERCIAL

## 2025-03-31 ENCOUNTER — HOSPITAL ENCOUNTER (OUTPATIENT)
Age: 35
Discharge: HOME | End: 2025-03-31
Payer: COMMERCIAL

## 2025-03-31 VITALS
SYSTOLIC BLOOD PRESSURE: 133 MMHG | TEMPERATURE: 98 F | HEART RATE: 80 BPM | RESPIRATION RATE: 18 BRPM | DIASTOLIC BLOOD PRESSURE: 63 MMHG | OXYGEN SATURATION: 99 %

## 2025-03-31 DIAGNOSIS — R07.81 RIB PAIN ON LEFT SIDE: ICD-10-CM

## 2025-03-31 DIAGNOSIS — R05.1 ACUTE COUGH: Primary | ICD-10-CM

## 2025-03-31 PROCEDURE — 99214 OFFICE O/P EST MOD 30 MIN: CPT | Performed by: NURSE PRACTITIONER

## 2025-03-31 PROCEDURE — 71101 X-RAY EXAM UNILAT RIBS/CHEST: CPT | Performed by: NURSE PRACTITIONER

## 2025-03-31 RX ORDER — BENZONATATE 100 MG/1
100 CAPSULE ORAL 3 TIMES DAILY PRN
Qty: 30 CAPSULE | Refills: 0 | Status: SHIPPED | OUTPATIENT
Start: 2025-03-31 | End: 2025-04-30

## 2025-03-31 RX ORDER — GUAIFENESIN 600 MG/1
1200 TABLET, EXTENDED RELEASE ORAL 2 TIMES DAILY
Qty: 20 TABLET | Refills: 0 | Status: SHIPPED | OUTPATIENT
Start: 2025-03-31 | End: 2025-04-05

## 2025-03-31 NOTE — ED PROVIDER NOTES
Patient Seen in: [facility]      History     Chief Complaint   Patient presents with    Cough/URI     Stated Complaint: cough; chest sorenes; chest pain when coughing    Subjective:   34-year-old female presents with complaint of a productive cough for 1 month and now with soreness and pain with coughing to her left anterior rib cage for about the last 4 to 5 days.  Patient is taking doxycycline for the last 4 days as prescribed for a telehealth visit for sinusitis.  OTCs for rib pain include ibuprofen taken yesterday.  Using albuterol if needed for any wheezing.    Patient denies fever, shortness of breath, and on exertion, dizziness, palpitations, nausea, vomiting, diarrhea.    The history is provided by the patient.         Objective:     Past Medical History:    Human papilloma virus infection    history of              Past Surgical History:   Procedure Laterality Date          Colposcopy, cervix w/upper adjacent vagina; w/biopsy(s), cervix  ,    cervix biopsy neg, endocervix curettage neg                Social History     Socioeconomic History    Marital status:    Tobacco Use    Smoking status: Never    Smokeless tobacco: Never   Vaping Use    Vaping status: Never Used   Substance and Sexual Activity    Alcohol use: Not Currently     Alcohol/week: 1.0 standard drink of alcohol     Types: 1 Standard drinks or equivalent per week     Comment: weekly    Drug use: No    Sexual activity: Yes     Partners: Male     Comment: trying for preg     Social Drivers of Health     Food Insecurity: No Food Insecurity (3/11/2025)    NCSS - Food Insecurity     Worried About Running Out of Food in the Last Year: No     Ran Out of Food in the Last Year: No   Transportation Needs: No Transportation Needs (3/11/2025)    NCSS - Transportation     Lack of Transportation: No   Housing Stability: Not At Risk (3/11/2025)    NCSS - Housing/Utilities     Has Housing: Yes     Worried About Losing  Housing: No     Unable to Get Utilities: No              Review of Systems   Constitutional:  Positive for chills. Negative for fever.   HENT:  Positive for congestion. Negative for sore throat and trouble swallowing.    Respiratory:  Positive for cough. Negative for shortness of breath and wheezing.    Gastrointestinal:  Negative for diarrhea, nausea and vomiting.       Positive for stated complaint: cough; chest sorenes; chest pain when coughing  Other systems are as noted in HPI.  Constitutional and vital signs reviewed.      All other systems reviewed and negative except as noted above.    Physical Exam     ED Triage Vitals [03/31/25 1645]   /63   Pulse 80   Resp 18   Temp 98.2 °F (36.8 °C)   Temp src Oral   SpO2 99 %   O2 Device None (Room air)       Current Vitals:   Vital Signs  BP: 133/63  Pulse: 80  Resp: 18  Temp: 98.2 °F (36.8 °C)  Temp src: Oral    Oxygen Therapy  SpO2: 99 %  O2 Device: None (Room air)        Physical Exam  Vitals and nursing note reviewed.   Constitutional:       General: She is not in acute distress.     Appearance: Normal appearance. She is not ill-appearing, toxic-appearing or diaphoretic.   HENT:      Head: Normocephalic.      Right Ear: Tympanic membrane normal.      Left Ear: Tympanic membrane normal.      Nose: Congestion present.      Mouth/Throat:      Mouth: Mucous membranes are moist.      Pharynx: Oropharynx is clear.   Eyes:      Conjunctiva/sclera: Conjunctivae normal.   Cardiovascular:      Rate and Rhythm: Normal rate and regular rhythm.   Pulmonary:      Effort: Pulmonary effort is normal. No respiratory distress.      Breath sounds: Normal breath sounds. No stridor. No wheezing, rhonchi or rales.   Chest:      Chest wall: Tenderness (anterior lower left ribs under breast) present.   Abdominal:      General: Abdomen is flat.      Palpations: Abdomen is soft.   Musculoskeletal:      Cervical back: Normal range of motion and neck supple. No tenderness.      Right  lower leg: No edema.      Left lower leg: No edema.   Skin:     General: Skin is warm and dry.      Findings: No bruising or rash.   Neurological:      General: No focal deficit present.      Mental Status: She is alert.   Psychiatric:         Mood and Affect: Mood normal.             ED Course   Labs Reviewed - No data to display     XR RIBS WITH CHEST (3 VIEWS), LEFT  (CPT=71101)          PROCEDURE:  XR RIBS WITH CHEST (3 VIEWS), LEFT  (CPT=71101)     TECHNIQUE:  PA Chest and three views of the ribs were obtained     COMPARISON:  [facility], XR, XR CHEST PA + LAT CHEST (CVT=63105), 3/09/2025, 9:56 AM.     INDICATIONS:  cough; chest sorenes; chest pain when coughing     PATIENT STATED HISTORY: (As transcribed by Technologist)  The patient has been coughing for one month and is not getting better. Pain to the left rib region from coughing.                          =====  CONCLUSION:  Normal cardiac and mediastinal contours.  No pulmonary edema or focal airspace consolidation.  The pleural spaces are clear.  No displaced rib fracture or other acute osseous findings.           LOCATION:  [id number]              Dictated by (CST): Armin Portillo MD on 3/31/2025 at 5:22 PM       Finalized by (CST): Armin Portillo MD on 3/31/2025 at 5:23 PM                   MDM      Medical Decision Making  34-year-old female presents with complaint of a productive cough for 1 month and now with soreness and pain with coughing to her left anterior rib cage for about the last 4 to 5 days.   Diff dx include pneumonia, rib fracture, costochondritis.   On exam, pt is well appearing with normal vitals, lungs clear bilaterally. Left anterior rib tender to palpation.   Chest/rib xray without acute findings.   Recommend to continue Doxycycline as previously prescribed, Ibuprofen with food, heating pad, splint with pillow when coughing, Rx Tessalon.   Follow up with PCP if any symptoms not improving as anticipated.   Pt understands and  is agreeable to plan.     Amount and/or Complexity of Data Reviewed  External Data Reviewed: notes.  Radiology: ordered.    Risk  OTC drugs.  Prescription drug management.        Disposition and Plan     Clinical Impression:  1. Acute cough    2. Rib pain on left side         Disposition:  Discharge  3/31/2025  5:50 pm    Follow-up:  No follow-up provider specified.        Medications Prescribed:  Discharge Medication List as of 3/31/2025  5:52 PM        START taking these medications    Details   !! benzonatate 100 MG Oral Cap Take 1 capsule (100 mg total) by mouth 3 (three) times daily as needed for cough., Normal, Disp-30 capsule, R-0      guaiFENesin ER (MUCINEX) 600 MG Oral Tablet 12 Hr Take 2 tablets (1,200 mg total) by mouth 2 (two) times daily for 5 days., Normal, Disp-20 tablet, R-0       !! - Potential duplicate medications found. Please discuss with provider.              Supplementary Documentation:

## 2025-03-31 NOTE — DISCHARGE INSTRUCTIONS
We recommend the following for your condition:    Splint with a pillow when coughing, sneezing, laughing.  Heating pad to area; just do not fall asleep with heating pad on.   Ibuprofen-600 mg as directed, take with food.  Continue to ice doxycycline as previously prescribed.  Tessalon-take 1 tab every 8 hours if needed for coughing.  Mucinex as directed with a full glass of water.     Follow up with PCP if not improving in the next week.     If any worsening symptoms, chest pain, dizziness, shortness of breath or any other concerning symptoms go directly to the ED.

## 2025-03-31 NOTE — ED INITIAL ASSESSMENT (HPI)
Patient has been coughing for a month. She feels a soreness and tightness to her left lower anterior rib cage. She is on Doxycycline for a sinus infection. But she is still coughing and feeling very tired. She has been using Albuterol only for coughing attacks, but she hasn't needed it in a week. Her cough is no longer dry, but the rib pain is what brought her in.

## 2025-05-08 ENCOUNTER — TELEPHONE (OUTPATIENT)
Dept: OBGYN CLINIC | Facility: CLINIC | Age: 35
End: 2025-05-08

## 2025-05-08 DIAGNOSIS — N91.2 AMENORRHEA: Primary | ICD-10-CM

## 2025-05-08 NOTE — TELEPHONE ENCOUNTER
Patient is non-established-  MyChart message sent with prenatal information.  Ultrasound order pended and routed for provider signature.

## 2025-05-08 NOTE — TELEPHONE ENCOUNTER
.Patient calling to initiate prenatal care  LMP -6-2  Patient is 7-8 weeks on 25  Confirmation of pregnancy appointment scheduled on   Future Appointments   Date Time Provider Department Center   2025 10:30 AM [facility] [address]   2025 11:15 AM Anabela Edmond MD [facility] [address]   7/3/2025 11:30 AM Isadora Partida APRN [facility]       Insurance [facility]    Any history of ectopic pregnancy? no  Any history of miscarriage? no  Any medications that you are taking on a regular basis other than prenatal vitamins? no (if not taking prenatal vitamins, encourage patient to start taking.)  Any bleeding since the first day of last LMP and your positive pregnancy test? no    Pt would like a , she's hoping to discuss with  on 1st appointment to make sure

## 2025-05-28 ENCOUNTER — TELEPHONE (OUTPATIENT)
Dept: OBGYN CLINIC | Facility: CLINIC | Age: 35
End: 2025-05-28

## 2025-05-28 NOTE — TELEPHONE ENCOUNTER
Non-established Patient needs  appointment prior to paperwork be completed. Message sent to patient in [facility].

## 2025-05-28 NOTE — TELEPHONE ENCOUNTER
Pt is in 1st trimester and is experience severe nausea (same as in previous pregnancy)  Pt is taking B6/Unisom at night, helps a little.  Work gave her an accomodation form for a doctor to sign so she can work from home until 13-14 wks.

## 2025-06-05 ENCOUNTER — OFFICE VISIT (OUTPATIENT)
Dept: OBGYN CLINIC | Facility: CLINIC | Age: 35
End: 2025-06-05
Payer: COMMERCIAL

## 2025-06-05 ENCOUNTER — ULTRASOUND ENCOUNTER (OUTPATIENT)
Dept: OBGYN CLINIC | Facility: CLINIC | Age: 35
End: 2025-06-05
Payer: COMMERCIAL

## 2025-06-05 ENCOUNTER — TELEPHONE (OUTPATIENT)
Dept: OBGYN CLINIC | Facility: CLINIC | Age: 35
End: 2025-06-05

## 2025-06-05 VITALS
WEIGHT: 193 LBS | SYSTOLIC BLOOD PRESSURE: 120 MMHG | DIASTOLIC BLOOD PRESSURE: 74 MMHG | HEIGHT: 64 IN | BODY MASS INDEX: 32.95 KG/M2 | HEART RATE: 74 BPM

## 2025-06-05 DIAGNOSIS — N91.2 AMENORRHEA: ICD-10-CM

## 2025-06-05 PROCEDURE — 76856 US EXAM PELVIC COMPLETE: CPT | Performed by: OBSTETRICS & GYNECOLOGY

## 2025-06-05 PROCEDURE — 99214 OFFICE O/P EST MOD 30 MIN: CPT | Performed by: OBSTETRICS & GYNECOLOGY

## 2025-06-05 RX ORDER — PYRIDOXINE HCL (VITAMIN B6) 50 MG
TABLET ORAL
COMMUNITY
Start: 2025-05-17

## 2025-06-05 NOTE — PATIENT INSTRUCTIONS
General Instructions for Common Concerns in Pregnancy    The following instructions are recommended by the OB/GYNE Department at [facility]. If symptoms persist for more than 2-3 days, please contact the office for further assistance at [phone].    Symptoms of a cold: sneezing, coughing, headache, runny nose, watery eyes, slightly elevated temperature (under 100). A temperature over 100, with a productive cough (yellow-green mucus), needs evaluation by your primary care provider. There is no cure for the cold/flu... it takes time!  Rest Chicken soup Increase fluid intake   Cepacol, Sucrets lozenges, or Halls (no Zinc) Robitussin cough syrup, any kind Sudafed       Heartburn or Upset Stomach: *Liquid antacids are frequently more effective than tablets. Call the office if no relief. Avoid spicy food or any food that may cause symptoms.  Maalox or Maalox Plus Pepcid Tums   Mylanta ll, sodium free Zantac 75mg twice daily Tagamet   Milk of Magnesia          Headache or Mild aches and pains associated with colds or flu: Call office if persistent or severe.   Rest Tylenol or Extra Strength Tylenol   (Acetaminophen)                                                           Diarrhea(simple): Call office if persists more than 2 days or if accompanied by a fever.  Contact the office if you have been in a foreign country, if other family members have been ill, or if there is a possibility of contact with contaminated food/water prior to onset.  Minimum residue diet: No milk, fruits or vegetables other than peeled, cooked potatoes. Avoid spicy or greasy foods. You can have liquids, bread, noodles, rice, plain pasta, and tender cooked meat. Fish and poultry may be eaten as desired.  Kaopectate  Immodium A-D Lomotil       Constipation (with no nausea or vomiting): Increase fluids, fiber, and activity. Call with severe straining.  Milk of Magnesia Colace 50 mg 1-2 caps daily Prune Juice   Metamucil/Benefiber          Hemorrhoids: Call if severe bleeding or pain  Preparation H Anusol Suppositories Tucks           OVER THE COUNTER MEDICATION USE IN PREGNANCY    NO ASPIRIN, IBUPROFIN (ADVIL, MOTRIN, OR ALEVE), OR ZINC    The following over the counter (OTC) medications may be safely taken by pregnant women following all the directions on the package for adult usage.    Cold, flu, and allergy relief: Nasal congestion, cough, sneezing, runny nose, minor aches and pains, scratchy/sore throat  Benadryl Class B   Benadryl Cold or Allergy Sinus Class C   Claritin, Claritin D Class B   Chlor-trimeton Allergy Class B   Chlor-trimeton Allergy-sinus Class C   Comtrex Allergy Sinus/Multi Symptom Cough Class C   Contact (Severe cold and flu, Decongestant, Antihistamine, Day and Night Cold/Flu) Class C   Ocean Nasal Spray/Mist Class B   Robitussin CF, DM, PE/Robitussin Cough and Cold/Robitussin Cough, Cold, and Flu Class C   Sinutab Sinus Allergy Class C   Sudafed/Tavist- D Class C   Theraflu Cold and Cough/Flu and Cold Class C   Tylenol Regular and Extra strength Class B   Tylenol Cold, Multi-symptom, Cold Nighttime Class C   Vicks Formula 44, 44D, and 44E Class C     Gas:  Gas X, Mylicon Drops, Mylanta Gas Class C     Motion Sickness:  Dramamine/Dramamine ll Class B     Yeast Symptoms:  Monistat 7 Class B     Insomnia:  Sominex Class B       Morning sickness prevention and remedies:  Eat a piece of toast or a few crackers before you get out of bed in the morning ( place them by your bed the night before), or when you feel nauseated.   Get out of bed slowly and avoid sudden movements.   Eat smaller, more frequent meals during the day so your stomach does not remain empty for very long.   Eat high protein foods: eggs, cheese, nuts, meats,  ect, as well as fruits/fruit juices. These foods help prevent low levels of sugar in your blood, which can also cause nausea.   Sip spearmint, peppermint, ginger, or raspberry leaf tea. Ginger ale, 7 up  , or Sprite   Vitamin B6 25 mg/day to start; if not helpful, increase by 25 mg/day until taking 100 mg/day maximum.   Medication and Mother's Milk, 18th ED, 2019 and Physician's Desk Reference, 71st Ed, 2017    Medications Safe in Pregnancy  The following over-the-counter medications may be taken safely after 12 weeks gestation by any patient who is pregnant.  Please follow the instructions on the package for adult dosage.  If you experience any symptoms prior to 12 weeks, please call the office at [phone].      Colds/Congestion: Flonase, Saline Nasal Spray, Neti Pot, Plain Robitussin, Robitussin DM, Mucinex DM, Vicks 44 E, Vicks Vapor rub, Cough drops without Zinc or Sudafed.   Contact your doctor if you have a persistent fever over 100.4, shortness of breath, coughing up green mucus, or a sore throat that persists from more than 3 days    Diarrhea: Imodium, Maalox Anti-Diarrheal or Kaopectate  Contact the office if you have diarrhea for more than 3 days.    Allergies: Benadryl, Claritin or Zyrtec    Hemorrhoids: Tucks pads, Preparation H, Annusol, Sitz bath or Witch hazel  Eat a high fiber diet and drink plenty of fluids.    Yeast: Monistat 3 or 7  Call if your symptoms do not improve, or if you experience vaginal bleeding, or a watery discharge.    Constipation: Metamucil, Colace, fiber supplement, Milk of Magnesia or Dulcolax  Drink plenty of fluids, eat high-fiber foods and take the above laxatives sporadically.     Headache or Mild aches and pain: Extra Strength Tylenol     Gas: Gas X, Gelusil, Papaya Tablets, Maalox, Mylicon or Mylanta Gas    Heartburn: Tums, Mylanta, Pepcid AC or Maalox    Sore throat: Alcohol free Chloraseptic spray or Lozenges     Nausea and Vomiting: ½ tablet Unisom plus 100mg of Vitamin B6 at bedtime (may increase B6 up to 200mg per day), Argelia root tea or raspberry leaf tea    Insomnia: Tylenol PM, Vitamin B6 50mg, warm bath or milk, Unisom, Nytol or Sominex.     We have listed a  few medications for common symptoms seen in pregnancy.  Please contact the office if you are unsure about any over the counter medications that are not listed above.      Foods to Avoid During Pregnancy  Deli Meats- You may eat deli meats from the deli.  If you eat deli meats in the package, it may be contaminated with Listeria. Heat all deli meats in a package to 165 degrees Fahrenheit before eating, even if the label states the meat is “pre-cooked”.    Soft Cheeses and Unpasteurized Products - You may eat soft cheeses that are pasteurized.  Please avoid all soft cheeses, milk or juice that is unpasteurized.  Fish- avoid fish that contains a high level of mercury. These include but are not limited to; Kidder, Orange Roughy, Tile Fish, Shark, Swordfish, and Braulio Mackerel. Please see chart below for good fish choices in pregnancy. Also avoid any raw, uncooked shellfish such as oysters, clams, or sushi.  Make sure to cook all meats; including ground beef, pork, and poultry to their desired temperatures before consuming. This reduces the chance of a food borne illness.  Caffeine- limit to 200 mg or an 8oz cup daily or less.   Alcohol- no amount of alcohol is determined to be safe in pregnancy. Do not drink any alcoholic beverages while pregnant.   Nutrition in Pregnancy                The ADA and ACOG define patients at increased risk of type 2 diabetes based on: body mass index (BMI) >=25 kg/m2 (>=23 kg/m2 in  Americans) plus one or more of the following [2,28]:     GDM in a previous pregnancy.     Glycated hemoglobin >=5.7 percent (39 mmol/mol), impaired glucose tolerance, or impaired fasting glucose on previous testing.     First-degree relative with diabetes.     High-risk race/ethnicity (eg, , , ,  American, ).     History of cardiovascular disease.     Hypertension (>=140/90 mmHg) or on therapy for hypertension.     High-density lipoprotein  cholesterol level <35 mg/dL (0.90 mmol/L) and/or a triglyceride level >250 mg/dL (2.82 mmol/L).     Polycystic ovary syndrome (PCOS).     Physical inactivity.     Other clinical condition associated with insulin resistance (eg, severe obesity, acanthosis nigricans).           Candidates     Low-dose aspirin 81 mg: Take 2 tablets by mouth daily starting at 12 weeks until 36 weeks        Selecting high risk women for prophylaxis -- The greatest benefit appears to be in women at moderate to high risk of developing the disease [10,13-20], in whom a 62 percent reduction of  preeclampsia occurred in the ASPRE trial [21]. We recommend low-dose aspirin prophylaxis for women at high risk for preeclampsia. There is no consensus on the exact criteria that confer high risk. It is reasonable to use the USPSTF high-risk criteria, which are also endorsed by the American College of Obstetricians and Gynecologists (ACOG) [22,23]. The incidence of preeclampsia is estimated to be at least 8 percent for pregnant women with any one of these high risk factors:     ?Previous pregnancy with preeclampsia, especially early onset and with an adverse outcome.     ?Type 1 or 2 diabetes mellitus.     ?Chronic hypertension.     ?Multifetal gestation.     ?Kidney disease.     ?Autoimmune disease with potential vascular complications (antiphospholipid syndrome, systemic lupus erythematosus).        We generally follow the USPSTF criteria and recommend low-dose aspirin for preeclampsia prevention to patients with two or more of the following moderate risk factors [22]:     ?Nulliparity.     ?Obesity (body mass index >30 kg/m2).     ?Family history of preeclampsia in mother or sister.     ?Age >=35 years.     ?Sociodemographic characteristics (Identify as black, lower income level [recognizing that these are not biological factors]).     ?Personal risk factors (eg, previous pregnancy with low birth weight or small for gestational age infant,  previous adverse pregnancy outcome [eg, stillbirth], interval >10 years between pregnancies).      [facility]- Prenatal Testing    The following information is designed to help you understand some of the optional tests that are available to you to screen for problems in your pregnancy.  Before considering any of these tests, you will need to consider the following questions:    [1] What would you do if an abnormality were detected?  If the answer is nothing, then you may decide to decline all testing.  [2] Would you be willing to undergo testing which might increase your risk of miscarriage, such as an amniocentesis or CVS (see below)?  [3] If you have the initial testing, and it indicates that there might be a problem, and you subsequently decide not to do invasive testing such as an amniocentesis, would you worry excessively through the remainder of the pregnancy?    The following tests are available to screen for problems in your pregnancy:      [1]  First Trimester Screening (also called Nuchal Thickness, Nuchal Translucency or NT)- This is an abdominal ultrasound done between 10 and 14 weeks by a perinatology specialist (Maternal Fetal Medicine, MFM) which measures the thickness of the skin behind your baby's neck.  Increased thickness of the skin in this area has been linked to an increased risk of genetic abnormalities like Down Syndrome.  A second visit may be required if the baby is not in the correct position.  The ultrasound results are combined with a finger stick blood test to increase the sensitivity of the test.  You will need to schedule an appointment with the Maternal Fetal Medicine office.  Address and phone number below:  Please verify insurance coverage:  CPT code: 04245 (coleman) or 16489 (twins)  Diagnosis: Genetic Screening- Z36.8A  Maternal Fetal Medicine  Dr. Goldsmith, Dr. Rojo, Dr. Gomez, and Dr. Fabian  [address]  [place] [address]  Phone [phone]  Fax  [phone]    [2] Cell-Free DNA testing (NIPT)- This is a blood test done any time after 10-11 weeks which screens for genetic abnormalities like Down Syndrome.  It is greater than 98% sensitive but requires an amniocentesis for confirmation if abnormal.  It can also tell you the sex of the baby.  CPT code: 11632  Diagnosis code: Genetic screening- Z36.8A  Testing options:   Danita- preferred for [facility] patients or all patients.  Please call [phone] or go to [email or web address] to review billing, prior authorizations or referrals  [email or web address]- Optional for all insurance plans except [facility].  Please call [employer] at [phone] or go to [email or web address] to review billing, prior authorizations, or referrals      [3]  Quad Screen (also called AFP-plus or Tetra Screen)-  This is a simple blood test which screens for both genetic abnormalities like Down Syndrome and neural tube defects (NTDs), such as spina bifida (an abnormal opening in the spine which can cause paralysis).  It is usually performed around 16-20 weeks.  If the Quad screen comes back abnormal, it does not mean that your baby has an abnormality.  It only means that there is an increased risk of an abnormality.  Additional testing such as a Level II Ultrasound or Amniocentesis may be recommended (see below).  This test is less accurate at picking up genetic abnormalities than the cell-free DNA test.  If you have test #1 or 2, then usually only the AFP part of the Quad screen would be performed to screen for NTD's (see below).  Please verify insurance coverage:  CPT code: 81068  Diagnosis code: Genetic screening- Z36.8A    [4]  Alpha Fetoprotein (AFP, MSAFP)- This is a simple blood test that screens for neural tube defects such as spina bifida (an abnormal opening in the spine).  It is usually performed around 16-20 weeks.  Additional testing such as a Level II ultrasound may be recommended for an abnormal test  result.  Please verify insurance coverage:  CPT code: 11242 Diagnosis code: Genetic Screening- Z36.8A    ---------------------------------------------------------------------------------------------------------------    Carrier screening:     [5] Cystic Fibrosis/SMA Carrier Screening- Cystic Fibrosis is a genetic disease which causes lung problems in the infant.  SMA (spinal muscular atrophy) is a genetic disease that affects the nerve cells of the spinal cord.  These genetic defects would need to be passed from both parents to the child.  A blood test is performed on the mother, and if her test is positive, then the father should also be tested. These tests can be done at any time in the pregnancy, usually in the first trimester with your initial OB labs.  All babies are screened for cystic fibrosis after birth.  Please verify insurance coverage:  Cystic Fibrosis CPT Code: 65709 Diagnosis code: Cystic Fibrosis screening- Z13.228  SMA CPT Code: 58745 Diagnosis code: Genetic Screening- Z36.8A or Testing for Genetic Disease Carrier- Z13.71    [6] Hemoglobinopathy carrier screening: The hemoglobinopathies are heterogeneous genetic disorders of hemoglobin (Hb) typically inherited in an autosomal recessive pattern. The clinical presentation ranges from asymptomatic in carriers to mild to severe disease in homozygotes and compound heterozygotes. At the severe end of the spectrum, hemoglobinopathies impact quality of life, require life-long care (typically with regular blood transfusions), and can shorten life expectancy. In the [place], the American College of Obstetricians and Gynecologists (ACOG) recommends carrier screening and counseling for genetic conditions, ideally before pregnancy. Firstline for hemoglobinopathy carrier screening, includes a CBC evaluating red cell indices in all pregnant individuals [17]. A hemoglobin electrophoresis (or other protein chemistry method) is performed in addition to a CBC if  there is suspicion of hemoglobinopathy based on ethnicity (, Mediterranean, , Southeast , or West  descent) or if red cell indices show a low mean MCV or MCH. Characteristics other than ethnicity that are associated with a higher risk for an individual to be a hemoglobinopathy carrier include a history of chronic anemia or stillbirth, a relative with a hemoglobin structural variant or thalassemia, and consanguinity In 2022 ACOG updated its recommendations to offer universal hemoglobinopathy testing to people planning pregnancy or at the initial prenatal visit if no prior testing results are available for interpretation. This is based on a high frequency of a hemoglobinopathy trait, in particular S trait, in the [place] and noting that race and self-identified ethnicity are poor alternatives for genetics.  Hemoglobin electrophoresis: Send out lab to [facility].  CPT Code: 66466 or 13245 or 68369 Diagnosis code: Z13.0 Encounter screening for hematological disorder    [employer] Carrier Screening: A carrier screening panel is available that includes cystic fibrosis, spinal muscular atrophy, hemoglobinopathy and additional autosomal recessive or X-linked disorders.  A full review of the carrier screening panel was available via [employer] website. Please call [phone] or go to [employer]/empower to review billing, prior authorizations or referrals.       ---------------------------------------------------------------------------------------------------------------    [7]  Level II Ultrasound-  This is an abdominal ultrasound done at approximately 20-21 weeks by a perinatology specialist (THOR) at [facility] which looks at many of the baby's internal structures.  Abnormalities in certain structures have been associated with an increased risk of genetic abnormalities.  It also screens for NTD's.  This ultrasound would replace the Level I Ultrasound that we normally perform at our  office around the same time.    [8]  Amniocentesis-  During this procedure, a needle is passed through your abdominal wall to withdrawal some amniotic fluid from around the baby.  It screens for both genetic abnormalities and NTD's and is usually performed around 15-16 weeks.  This test has the highest accuracy for detecting genetic abnormalities, but there may be a small risk of miscarriage or other complications.  A similar procedure called Chorionic Villus Sampling (CVS) is performed by passing a catheter through the vagina to remove a small sample of tissue from the placenta (afterbirth).  CVS may have a higher risk of miscarriage and other rare complications compared to amniocentesis but can be performed earlier in pregnancy.  Amniocentesis is often recommended/offered if any of the previously mentioned tests come back abnormal.  A pamphlet is available if you would like more information about this option.  If you decide to have an amniocentesis, the other tests would be unnecessary.      The above information covers some of the basics.  Pamphlets on the Cell-Free DNA test, Quad Screen and Cystic Fibrosis test should be in your prenatal packet.  Your doctor will discuss this information in more detail, and feel free to ask additional questions.  Also, remember that all of these tests are optional, and will only be performed at your request.  Testing that needs to be done through the perinatologist's office may require 2-3 weeks lead time to schedule.

## 2025-06-05 NOTE — PROGRESS NOTES
[facility]  Obstetrics and Gynecology    The following individual(s) verbally consented to be recorded using ambient AI listening technology and understand that they can each withdraw their consent to this listening technology at any point by asking the clinician to turn off or pause the recording:    Name: Sabrina Carter    Subjective:     Sabrina Carter is a 35 year old  female presents with c/o secondary amenorrhea and positive pregnancy test.     History of Present Illness  Sabrina Carter is a 35 year old female who presents with nausea and vomiting during her third pregnancy.    She experiences nausea and vomiting, particularly at night, which has been a consistent issue in her previous pregnancies. She manages her symptoms with Unisom and vitamin B6, taking 50 mg of B6 at night. Despite this, she continues to feel unwell during the day and has been vomiting at night for the past two nights. She has tried using Dramamine for nausea during a recent event, but it did not provide relief. Benadryl, similar to Unisom, makes her sleepy but does not alleviate nausea.    She is currently 8 weeks and 4 days pregnant with a due date of , based on her last menstrual period. She has no history of gestational diabetes, high blood pressure, or preeclampsia in previous pregnancies.    She works as an  and finds it challenging to commute to the office three days a week due to her symptoms, especially given the hour and fifteen-minute drive each way. She is seeking an accommodation to work from home until the end of her first trimester.      Patient's last menstrual period was 2025 (exact date)..       Review of Systems:  General: no complaints per category. See HPI for additional information.   Breast: no complaints per category. See HPI for additional information.   Respiratory: no complaints per category. See HPI for additional information.   Cardiovascular:  no complaints per category. See HPI for additional information.   GI: no complaints per category. See HPI for additional information.   : no complaints per category. See HPI for additional information.   Heme: no complaints per category. See HPI for additional information.     OB History:   OB History    Para Term  AB Living   3 2 2   2   SAB IAB Ectopic Multiple Live Births      0 2      # Outcome Date GA Lbr Remi/2nd Weight Sex Type Anes PTL Lv   3 Current            2 Term 23 40w5d 08:00 / 02:26 9 lb 1.7 oz (4.13 kg) F VAGINAL CARLI EPI N GREG      Complications: Other - see comments   1 Term 11 39w0d  5 lb 11 oz (2.58 kg) F Caesarean   GREG       Gyne History:     Patient's last menstrual period was 2025 (exact date).      Meds:  Medications Ordered Prior to Encounter[1]    All:  Allergies[2]    PMH:  Past Medical History[3]    PSH:  Past Surgical History[4]    Objective:     Vitals:    25 1103   BP: 120/74   Pulse: 74   Weight: 193 lb (87.5 kg)   Height: 64\"         Body mass index is 33.13 kg/m².    General: AAO.NAD.   CVS exam: normal peripheral perfusion  Chest: non-labored breathing, no tachypnea   Abdominal exam: deferred   Pelvic exam: deferred     Imaging:  Ultrasound scan performed transabdominally.     LMP 2025   8w4d by LMP;  9w1d by ultrasound     Viable intrauterine pregnancy   Dates consistent with ultrasound.   Final BRITT:  2026 by LMP consistent with ultrasound on 2025     Gestational sac with yolk sac and fetal pole with cardiac activity visualized.   bpm.   Cervix appears normal. Normal ovaries bilaterally. No free fluid visualized.     Anabela Edmond MD   [facility] - OBGYN        Assessment:     Sabrina Carter is a 35 year old  female who presents for secondary amenorrhea and positive pregnancy test      Plan:     Assessment & Plan  Nausea and vomiting in pregnancy  Experiencing nausea and vomiting, particularly at night, during  her third pregnancy. Currently managing symptoms with Unisom and vitamin B6 at night, providing some relief. Increasing B6 dosage to up to 100 mg per day is recommended, as she currently takes 50 mg at night. If symptoms persist, prescription Reglan is an option, but Zofran is avoided before 12 weeks due to theoretical risks of birth defects. Dramamine and Benadryl have been ineffective.  - Increase vitamin B6 dosage to up to 100 mg per day.  - Consider prescription Reglan if symptoms persist.  - Avoid Zofran before 12 weeks of pregnancy.    Pregnancy management  Currently 8 weeks and 4 days pregnant with a due date of . The dating ultrasound and menstrual period dates differ by four days, but the period date will be used for accuracy. Considering a , with induction discussions if she reaches 40 weeks without spontaneous labor. Dietary management advice for early pregnancy nausea and vomiting was provided, emphasizing bland foods and small meals. Seeking work accommodation to work from home during the first trimester due to symptoms and long commute.  - Use menstrual period date for pregnancy dating.  - Discuss  options closer to the due date.  - Provide dietary management advice for nausea and vomiting.  - Assist with work accommodation paperwork for working from home.    Genetic testing  Interested in noninvasive prenatal testing (NIPT) and has had issues with out-of-network costs in previous pregnancies. Options with [facility] and [facility] were discussed, advising her to verify coverage with her insurance. [facility] offers home blood draws, which may be convenient.  - Verify insurance coverage for NIPT with [facility] or [facility].  - Schedule NIPT between 12 and 13 weeks of pregnancy.    Gestational diabetes and preeclampsia risk assessment  No history of gestational diabetes, hypertension, or preeclampsia in previous pregnancies. BMI may be over 30, which could qualify her for an early glucose  tolerance test and aspirin use, but no family history of preeclampsia was reported.  - Consider early one-hour glucose tolerance test if BMI is over 30.  - Evaluate need for aspirin based on BMI and family history.    General Health Maintenance  General pregnancy health advice was provided, including dietary recommendations and prenatal vitamin intake. Advised to avoid cold cuts, limit caffeine to 200 mg per day, and avoid raw fish to maintain safe mercury levels.  - Cross-reference prenatal vitamin for adequate vitamins and minerals.  - Avoid cold cuts and raw fish.  - Limit caffeine intake to 200 mg per day.    Patient Active Problem List    Diagnosis    Vaginal birth after  section (HCC)     G1 C/S, G2   [ ] discuss          Secondary amenorrhea due to positive pregnancy test   - Ultrasound reviewed and discussed with patient, refer above  - Pt counseled on safety, diet, exercise, caffiene, tobacco, ETOH, sexual activity, ER precautions, diet, exercise, appropriate otc medication use, substance abuse   - Counseled on taking a PNV with 0.4mg of folic acid   - genetic screening testing d/w patient and family, pt declines invasive diagnostic testing and considering first versus second trimester screening   - Carrier screening, neural tube defect screening and hemoglobinopathy screening reviewed and discussed with patient; information provided and patient considering options and recommended to request desired screening at follow-up visit  - advised to follow up to establish prenatal care   - SAB precautions provided   - d/w nausea and vomiting in pregnancy including vitamin B6 and unisom   - COVID 19 precautions provided, recommend vaccination and booster if/when applicable   -Travel precautions provided, patient advised to not travel beyond 36 weeks as long as the pregnancy remains low risk.  Advised not to travel beyond 28 weeks for high risk pregnancy. Recommend compression socks, increase water intake  and movement during traveling to prevent blood clots.    All of the findings and plan were discussed with the patient.  They note understanding and agreement with the plan of care.  All questions were answered to the best of my ability at this time.    Discussed with patient that there will not be further notification of normal or benign results other than receiving results on mychart. A [employer] message or telephone call will be placed by the physician and/or office staff if results are abnormal.       Total patient time was 30 minutes in evaluation, consultation, and coordination of care. This included face to face and non-face to face actions. The patient's questions and concerns that were addressed.     RTC in  4 weeks for NOB visit or sooner if needed     Anabela Edmond MD   [facility] - OBGYN      Note to patient and family   The 21st Century Cures Act makes medical notes available to patients in the interest of transparency.  However, please be advised that this is a medical document.  It is intended as [id number]-[id number] communication.  It is written and medical language may contain abbreviations or verbiage that are technical and unfamiliar.  It may appear blunt or direct.  Medical documents are intended to carry relevant information, facts as evident, and the clinical opinion of the practitioner.          [1]   Current Outpatient Medications on File Prior to Visit   Medication Sig Dispense Refill    Doxylamine Succinate, Sleep, (UNISOM OR)       Pyridoxine HCl (B-6) 50 MG Oral Tab       PRENATAL VITAMIN TABS   OR 1 tablet daily 90 Tab 3     No current facility-administered medications on file prior to visit.   [2] No Known Allergies  [3]   Past Medical History:   Allergic rhinitis    My entire life    Human papilloma virus infection    history of   [4]   Past Surgical History:  Procedure Laterality Date          Colposcopy, cervix w/upper adjacent vagina; w/biopsy(s), cervix  ,    cervix biopsy neg,  endocervix curettage neg      2023

## 2025-06-05 NOTE — TELEPHONE ENCOUNTER
Patient dropped off accomodation forms for completion-patient completed FLY and will pay fee on My Chart-sent to forms dept via email and interoffice mail for completion.

## 2025-06-06 ENCOUNTER — TELEPHONE (OUTPATIENT)
Dept: OBGYN CLINIC | Facility: CLINIC | Age: 35
End: 2025-06-06

## 2025-06-06 ENCOUNTER — PATIENT MESSAGE (OUTPATIENT)
Dept: OBGYN CLINIC | Facility: CLINIC | Age: 35
End: 2025-06-06

## 2025-06-06 RX ORDER — METOCLOPRAMIDE 10 MG/1
10 TABLET ORAL EVERY 6 HOURS PRN
Qty: 20 TABLET | Refills: 0 | Status: CANCELLED | OUTPATIENT
Start: 2025-06-06

## 2025-06-06 RX ORDER — METOCLOPRAMIDE 10 MG/1
10 TABLET ORAL EVERY 6 HOURS PRN
Qty: 20 TABLET | Refills: 0 | Status: SHIPPED | OUTPATIENT
Start: 2025-06-06

## 2025-06-06 NOTE — TELEPHONE ENCOUNTER
Please see patient TE from this morning.  Patient was in for  appt yesterday.  The patient s/w the provider yesterday about nausea and was advised to try B6-pt is not receiving any relief and it was discussed that a rx for reglan could possibly be provided.   Please confirm if rx can be sent into the pharmacy on file before the start of the weekend.  Thank you.

## 2025-06-06 NOTE — TELEPHONE ENCOUNTER
8w5d    Patient seen yesterday, increased B6 to 100 mg yesterday and today as advised.  Nausea is worse today and she vomits atleast once daily.  Requesting Rx for Reglan.    Routed to provider for review-  Order pended for your consideration

## 2025-06-06 NOTE — TELEPHONE ENCOUNTER
Patient called the status of Americans with Disabilities Act form. Located form and logged it for processing. Patient is requesting to have form processed as a STAT.       Type of Leave: Americans with Disabilities Act   Reason for Leave: work from home til the end of 1st trimester due to nausea from pregnancy   Start date of leave: 06/09/25   End date of leave: 07/21/25   How many flare ups per month/length?:  How many appts per month/length?:   Was Fee and Turnaround info Given?: Yes

## 2025-06-06 NOTE — TELEPHONE ENCOUNTER
Marcus Edmond,    Patient is requesting Americans with Disabilities Act to work from home through the duration of her 1st trimester. Patient is requesting accommodations to begin 06/09/25 and end 07/21/25.    Do you support?    Thank you  Poly

## 2025-06-24 ENCOUNTER — TELEPHONE (OUTPATIENT)
Dept: OBGYN CLINIC | Facility: CLINIC | Age: 35
End: 2025-06-24

## 2025-06-24 NOTE — TELEPHONE ENCOUNTER
Pt currently pregnant and would like to obtain an order for the DNA/Danita testing to be done prior to next appt. Pls let her know via GILUPI when completed.  Future Appointments   Date Time Provider Department Center   7/3/2025 11:30 AM Isadora Partida APRN [facility] [place]   7/31/2025 10:15 AM Margarita Lovett DO [facility] [address]

## 2025-06-25 NOTE — TELEPHONE ENCOUNTER
BRITT 1/11/2026- currently 11w3d   Blood type: O+  Patient desires NIPT testing- would like gender reported.  Order placed for Panorama.  Patient aware that [facility] will send her a link by text or email to schedule mobile phlebotomy and ship a kit to her home.  Advised to defer blood draw until after 12 weeks gestation to reduce the need for a repeat test.  Advised not to look at results on [facility] portal or in [employer] if she is not ready to see the gender.  Reminded patient to complete additional lab orders at any [place] lab location.  Patient verbalized understanding.         Order Number: [id number]

## 2025-07-01 RX ORDER — METOCLOPRAMIDE 10 MG/1
10 TABLET ORAL EVERY 6 HOURS PRN
Qty: 20 TABLET | Refills: 0 | Status: SHIPPED | OUTPATIENT
Start: 2025-07-01

## 2025-07-01 NOTE — TELEPHONE ENCOUNTER
Last OV: 6/5/25  Dr. Edmond  Last Refill Date: 6/6/25 # 20 0 refills       Disp Refills Start End    metoclopramide (REGLAN) 10 MG Oral Tab 20 tablet 0 6/6/2025 --    Sig - Route: Take 1 tablet (10 mg total) by mouth every 6 (six) hours as needed. - Oral    Sent to pharmacy as: Metoclopramide HCl 10 MG Oral Tablet (Reglan)    E-Prescribing Status: Receipt confirmed by pharmacy (6/6/2025  1:40 PM CDT)      Follow Up:  7/3/25 New PtAlessandra Bonilla Appt. As above.    PRESCRIPTION sent.

## 2025-07-03 ENCOUNTER — INITIAL PRENATAL (OUTPATIENT)
Dept: OBGYN CLINIC | Facility: CLINIC | Age: 35
End: 2025-07-03
Payer: COMMERCIAL

## 2025-07-03 VITALS
SYSTOLIC BLOOD PRESSURE: 114 MMHG | DIASTOLIC BLOOD PRESSURE: 66 MMHG | HEIGHT: 64 IN | BODY MASS INDEX: 33.66 KG/M2 | HEART RATE: 61 BPM | WEIGHT: 197.13 LBS

## 2025-07-03 DIAGNOSIS — Z36.9 ANTENATAL SCREENING ENCOUNTER (HCC): ICD-10-CM

## 2025-07-03 DIAGNOSIS — Z3A.12 12 WEEKS GESTATION OF PREGNANCY (HCC): Primary | ICD-10-CM

## 2025-07-03 DIAGNOSIS — O99.210 OBESITY AFFECTING PREGNANCY, ANTEPARTUM, UNSPECIFIED OBESITY TYPE (HCC): ICD-10-CM

## 2025-07-03 PROCEDURE — 87086 URINE CULTURE/COLONY COUNT: CPT | Performed by: NURSE PRACTITIONER

## 2025-07-03 PROCEDURE — 87491 CHLMYD TRACH DNA AMP PROBE: CPT | Performed by: NURSE PRACTITIONER

## 2025-07-03 PROCEDURE — 87591 N.GONORRHOEAE DNA AMP PROB: CPT | Performed by: NURSE PRACTITIONER

## 2025-07-03 PROCEDURE — 87801 DETECT AGNT MULT DNA AMPLI: CPT | Performed by: NURSE PRACTITIONER

## 2025-07-03 RX ORDER — FAMOTIDINE 20 MG/1
20 TABLET, FILM COATED ORAL 2 TIMES DAILY
COMMUNITY

## 2025-07-03 NOTE — PROGRESS NOTES
Subjective:  Chief Complaint   Patient presents with    Prenatal Care     NOB     35 year old  female    presents for new OB visit    Patient's last menstrual period was 2025 (exact date). Estimated Date of Delivery: 26   Patient complaining of nausea since finding out she is pregnant. Has been intermittently   She is taking an OTC PNV with DHA.  She has no concerns regarding this pregnancy.    OB History    Para Term  AB Living   3 2 2   2   SAB IAB Ectopic Multiple Live Births      0 2      # Outcome Date GA Lbr Remi/2nd Weight Sex Type Anes PTL Lv   3 Current            2 Term 23 40w5d 08:00 / 02:26 9 lb 1.7 oz (4.13 kg) F VAGINAL CARLI EPI N GREG      Complications: Other - see comments   1 Term 11 39w0d  5 lb 11 oz (2.58 kg) F Caesarean   GREG       Past Medical History[1]  Past Surgical History[2]    Medications:  Medications Ordered Prior to Encounter[3]    Allergies:  Allergies[4]    Social History:  Social History     Tobacco Use    Smoking status: Never    Smokeless tobacco: Never   Substance Use Topics    Alcohol use: Not Currently     Alcohol/week: 1.0 standard drink of alcohol     Types: 1 Standard drinks or equivalent per week     Comment: weekly       Family History:  Family History[5]    Review of Systems:  Pertinent items are noted in HPI.    Objective:  /66   Pulse 61   Ht 64\"   Wt 197 lb 2 oz (89.4 kg)   LMP 2025 (Exact Date)   BMI 33.84 kg/m²    Physical Examination:  General appearance: Well dressed, well nourished in no apparent distress  Neurologic/Psychiatric: Alert and oriented to person, place and time, mood normal, affect appropriate  Head: Normocephalic without obvious deformity, atraumatic  Neck: No thyromegaly, supple, non-tender, no masses, no adenopathy  Lungs: Clear to auscultation bilaterally, no rales, wheezes or rhonchi  Breasts: Symmetric, non-tender, no masses, lesions, retraction, dimpling or discharge bilaterally, no  axillary or supraclavicular lymphadenopathy  Heart: Regular rate and rhythm, no gallops or murmurs  Abdomen: Soft, non-tender, non-distended, no masses, no hepatosplenomegaly, no hernias, no inguinal lymphadenopathy  Pelvic:    External genitalia- Normal, Bartholin's, urethra, skeins glands normal   Vagina- No vaginal lesions, no discharge   Cervix- No lesions, long/closed, no cervical motion tenderness   Uterus-  non-tender, no masses   Adnexa-  Non-tender, no masses   Pelvimetry- Normal pelvimetry, normal arch  Extremities: Non-tender, full range of motion, no clubbing, cyanosis or edema  Skin:  General inspection- no rashes, lesions or discoloration      Assessment:  35 year old  EGA 12w4d BRITT 2026  Amenorrhea, positive pregnancy test.  Addressed patients concerns regarding pregnancy.    Plan:  Pap smear 3/2025/STD screen obtained.  Order for prenatal labs with early 1 hour GTT given.  Patient consents to HIV testing, counseled and cleared for release of HIV test results to patient.  Discussed optional prenatal screening tests including cfDNA, CF/carrier screen, NT/first trimester screen, Quad Screen/AFP, Level II ultrasound and amniocentesis/CVS.  Completed Danita on , awaiting results  Prenatal vitamins with DHA.  New OB education completed.  Vaccine recommendations reviewed including Pertussis 26-37 weeks, flu vaccine any trimester, Covid vaccine and booster.  Discussed diet, exercise, travel, food and medication safety.  Information sheets on prenatal screening tests, vaccine recommendations, nausea in pregnancy and prenatal care given.    Diagnoses and all orders for this visit:    12 weeks gestation of pregnancy (HCC)    Obesity affecting pregnancy, antepartum, unspecified obesity type (HCC)  -     Glucose 1 HR OB; Future     screening encounter ([facility])  -     Type and screen; Future  -     CBC W Differential W Platelet; Future  -     Hepatitis B Surface Antigen; Future  -      T Pallidum Screening [place]; Future  -     Rubella, IGG; Future  -     HIV AG AB COMBO; Future  -     Urine Culture, Routine; Future  -     HCV Antibody; Future  -     Varicella Zoster, IGG; Future  -     Chlamydia/Gc Amplification; Future        Return in about 4 weeks (around 2025) for PALMER.              [1]   Past Medical History:   Allergic rhinitis    My entire life    Human papilloma virus infection    history of   [2]   Past Surgical History:  Procedure Laterality Date          Colposcopy, cervix w/upper adjacent vagina; w/biopsy(s), cervix  ,    cervix biopsy neg, endocervix curettage neg      2023       [3]   Current Outpatient Medications on File Prior to Visit   Medication Sig Dispense Refill    famotidine 20 MG Oral Tab Take 1 tablet (20 mg total) by mouth 2 (two) times daily.      metoclopramide (REGLAN) 10 MG Oral Tab Take 1 tablet (10 mg total) by mouth every 6 (six) hours as needed. 20 tablet 0    Doxylamine Succinate, Sleep, (UNISOM OR)       Pyridoxine HCl (B-6) 50 MG Oral Tab       PRENATAL VITAMIN TABS   OR 1 tablet daily 90 Tab 3     No current facility-administered medications on file prior to visit.   [4] No Known Allergies  [5]   Family History  Problem Relation Age of Onset    Cancer Father         Appendix Cancer    Dementia Maternal Grandmother         Had dementia for 12 years before finally passing away from it.    Cancer Paternal Grandfather         Prostate Cancer    Cancer Paternal Grandmother         Breast Cancer

## 2025-07-05 ENCOUNTER — TELEPHONE (OUTPATIENT)
Dept: OBGYN CLINIC | Facility: CLINIC | Age: 35
End: 2025-07-05

## 2025-07-05 DIAGNOSIS — Z13.79 GENETIC SCREENING: Primary | ICD-10-CM

## 2025-07-05 NOTE — TELEPHONE ENCOUNTER
Danita gerber fax received via fax, placed in nurse bin in [facility] for review

## 2025-07-07 LAB
C TRACH DNA SPEC QL NAA+PROBE: NEGATIVE
N GONORRHOEA DNA SPEC QL NAA+PROBE: NEGATIVE

## 2025-07-08 NOTE — TELEPHONE ENCOUNTER
Panorama results populated from [facility] Everywhere and copied into Epic.  Results available in provider's In-Basket for review.

## 2025-07-11 ENCOUNTER — LAB ENCOUNTER (OUTPATIENT)
Dept: LAB | Age: 35
End: 2025-07-11
Attending: NURSE PRACTITIONER
Payer: COMMERCIAL

## 2025-07-11 DIAGNOSIS — Z36.9 ANTENATAL SCREENING ENCOUNTER (HCC): ICD-10-CM

## 2025-07-11 DIAGNOSIS — O99.210 OBESITY AFFECTING PREGNANCY, ANTEPARTUM, UNSPECIFIED OBESITY TYPE (HCC): ICD-10-CM

## 2025-07-11 LAB
ANTIBODY SCREEN: NEGATIVE
BASOPHILS # BLD AUTO: 0.03 X10(3) UL (ref 0–0.2)
BASOPHILS NFR BLD AUTO: 0.4 %
EOSINOPHIL # BLD AUTO: 0.1 X10(3) UL (ref 0–0.7)
EOSINOPHIL NFR BLD AUTO: 1.3 %
ERYTHROCYTE [DISTWIDTH] IN BLOOD BY AUTOMATED COUNT: 12.9 %
GLUCOSE 1H P GLC SERPL-MCNC: 119 MG/DL (ref 70–130)
HBV SURFACE AG SER-ACNC: <0.1 [IU]/L
HBV SURFACE AG SERPL QL IA: NONREACTIVE
HCT VFR BLD AUTO: 36.5 % (ref 35–48)
HCV AB SERPL QL IA: NONREACTIVE
HGB BLD-MCNC: 12.4 G/DL (ref 12–16)
IMM GRANULOCYTES # BLD AUTO: 0.02 X10(3) UL (ref 0–1)
IMM GRANULOCYTES NFR BLD: 0.3 %
LYMPHOCYTES # BLD AUTO: 1.35 X10(3) UL (ref 1–4)
LYMPHOCYTES NFR BLD AUTO: 18 %
MCH RBC QN AUTO: 30.5 PG (ref 26–34)
MCHC RBC AUTO-ENTMCNC: 34 G/DL (ref 31–37)
MCV RBC AUTO: 89.7 FL (ref 80–100)
MONOCYTES # BLD AUTO: 0.3 X10(3) UL (ref 0.1–1)
MONOCYTES NFR BLD AUTO: 4 %
NEUTROPHILS # BLD AUTO: 5.7 X10 (3) UL (ref 1.5–7.7)
NEUTROPHILS # BLD AUTO: 5.7 X10(3) UL (ref 1.5–7.7)
NEUTROPHILS NFR BLD AUTO: 76 %
PLATELET # BLD AUTO: 220 10(3)UL (ref 150–450)
RBC # BLD AUTO: 4.07 X10(6)UL (ref 3.8–5.3)
RH BLOOD TYPE: POSITIVE
RUBV IGG SER QL: POSITIVE
RUBV IGG SER-ACNC: 64 IU/ML (ref 10–?)
T PALLIDUM AB SER QL IA: NONREACTIVE
WBC # BLD AUTO: 7.5 X10(3) UL (ref 4–11)

## 2025-07-11 PROCEDURE — 87389 HIV-1 AG W/HIV-1&-2 AB AG IA: CPT

## 2025-07-11 PROCEDURE — 86900 BLOOD TYPING SEROLOGIC ABO: CPT

## 2025-07-11 PROCEDURE — 86850 RBC ANTIBODY SCREEN: CPT

## 2025-07-11 PROCEDURE — 86762 RUBELLA ANTIBODY: CPT

## 2025-07-11 PROCEDURE — 82950 GLUCOSE TEST: CPT

## 2025-07-11 PROCEDURE — 87340 HEPATITIS B SURFACE AG IA: CPT

## 2025-07-11 PROCEDURE — 86803 HEPATITIS C AB TEST: CPT

## 2025-07-11 PROCEDURE — 86901 BLOOD TYPING SEROLOGIC RH(D): CPT

## 2025-07-11 PROCEDURE — 86787 VARICELLA-ZOSTER ANTIBODY: CPT

## 2025-07-11 PROCEDURE — 85025 COMPLETE CBC W/AUTO DIFF WBC: CPT

## 2025-07-11 PROCEDURE — 86780 TREPONEMA PALLIDUM: CPT

## 2025-07-11 PROCEDURE — 36415 COLL VENOUS BLD VENIPUNCTURE: CPT

## 2025-07-14 LAB — VZV IGG SER IA-ACNC: 9.32 (ref 1–?)

## 2025-07-18 RX ORDER — METOCLOPRAMIDE 10 MG/1
10 TABLET ORAL EVERY 6 HOURS PRN
Qty: 20 TABLET | Refills: 0 | Status: SHIPPED | OUTPATIENT
Start: 2025-07-18

## 2025-07-18 NOTE — TELEPHONE ENCOUNTER
Last OV: 7/3/25 SHEREE Isadora  Last Refill Date: 7/1/25 # 20 0 refills.     Disp Refills Start End    metoclopramide (REGLAN) 10 MG Oral Tab 20 tablet 0 7/1/2025 --    Sig - Route: Take 1 tablet (10 mg total) by mouth every 6 (six) hours as needed. - Oral    Sent to pharmacy as: Metoclopramide HCl 10 MG Oral Tablet (Reglan)    E-Prescribing Status: Receipt confirmed by pharmacy (7/1/2025  8:07 AM CDT)      Follow Up:  7/31/25 16 week PALMER Rudolph  Next Appt. As above    Refill sent.

## 2025-07-31 ENCOUNTER — ROUTINE PRENATAL (OUTPATIENT)
Dept: OBGYN CLINIC | Facility: CLINIC | Age: 35
End: 2025-07-31
Payer: COMMERCIAL

## 2025-07-31 VITALS
HEART RATE: 87 BPM | HEIGHT: 64 IN | BODY MASS INDEX: 33.99 KG/M2 | DIASTOLIC BLOOD PRESSURE: 68 MMHG | SYSTOLIC BLOOD PRESSURE: 112 MMHG | WEIGHT: 199.13 LBS

## 2025-07-31 DIAGNOSIS — O34.219: ICD-10-CM

## 2025-07-31 DIAGNOSIS — Z34.82 ENCOUNTER FOR SUPERVISION OF OTHER NORMAL PREGNANCY IN SECOND TRIMESTER (HCC): Primary | ICD-10-CM

## 2025-07-31 DIAGNOSIS — O99.210 OBESITY AFFECTING PREGNANCY, ANTEPARTUM, UNSPECIFIED OBESITY TYPE (HCC): ICD-10-CM

## 2025-07-31 PROBLEM — Z34.90 SUPERVISION OF NORMAL PREGNANCY (HCC): Status: ACTIVE | Noted: 2025-07-31

## 2025-08-05 ENCOUNTER — TELEPHONE (OUTPATIENT)
Dept: OBGYN CLINIC | Facility: CLINIC | Age: 35
End: 2025-08-05

## 2025-08-11 RX ORDER — METOCLOPRAMIDE 10 MG/1
10 TABLET ORAL EVERY 6 HOURS PRN
Qty: 20 TABLET | Refills: 0 | Status: SHIPPED | OUTPATIENT
Start: 2025-08-11

## 2025-08-28 ENCOUNTER — ROUTINE PRENATAL (OUTPATIENT)
Dept: OBGYN CLINIC | Facility: CLINIC | Age: 35
End: 2025-08-28

## 2025-08-28 VITALS
HEART RATE: 84 BPM | DIASTOLIC BLOOD PRESSURE: 64 MMHG | SYSTOLIC BLOOD PRESSURE: 122 MMHG | BODY MASS INDEX: 34.45 KG/M2 | WEIGHT: 201.81 LBS | HEIGHT: 64 IN

## 2025-08-28 DIAGNOSIS — Z3A.20 20 WEEKS GESTATION OF PREGNANCY (HCC): Primary | ICD-10-CM

## 2025-08-28 PROBLEM — O21.9: Status: ACTIVE | Noted: 2025-08-28

## 2025-08-28 PROBLEM — O35.BXX1 ECHOGENIC FOCUS OF HEART OF FETUS AFFECTING ANTEPARTUM CARE OF MOTHER, FETUS 1 (HCC): Status: ACTIVE | Noted: 2025-08-28

## 2025-08-28 PROCEDURE — 76805 OB US >/= 14 WKS SNGL FETUS: CPT | Performed by: STUDENT IN AN ORGANIZED HEALTH CARE EDUCATION/TRAINING PROGRAM

## 2025-08-28 RX ORDER — METOCLOPRAMIDE 10 MG/1
10 TABLET ORAL EVERY 6 HOURS PRN
Qty: 30 TABLET | Refills: 3 | Status: SHIPPED | OUTPATIENT
Start: 2025-08-28